# Patient Record
Sex: MALE | Race: WHITE | NOT HISPANIC OR LATINO | Employment: FULL TIME | URBAN - METROPOLITAN AREA
[De-identification: names, ages, dates, MRNs, and addresses within clinical notes are randomized per-mention and may not be internally consistent; named-entity substitution may affect disease eponyms.]

---

## 2017-06-01 ENCOUNTER — OFFICE VISIT - HEALTHEAST (OUTPATIENT)
Dept: INTERNAL MEDICINE | Facility: CLINIC | Age: 52
End: 2017-06-01

## 2017-06-01 DIAGNOSIS — Z00.00 ROUTINE GENERAL MEDICAL EXAMINATION AT A HEALTH CARE FACILITY: ICD-10-CM

## 2017-06-01 DIAGNOSIS — S76.301D RIGHT HAMSTRING INJURY, SUBSEQUENT ENCOUNTER: ICD-10-CM

## 2017-06-01 DIAGNOSIS — G89.29 CHRONIC LOW BACK PAIN: ICD-10-CM

## 2017-06-01 DIAGNOSIS — L30.9 ECZEMA: ICD-10-CM

## 2017-06-01 DIAGNOSIS — M54.50 CHRONIC LOW BACK PAIN: ICD-10-CM

## 2017-06-01 LAB
CHOLEST SERPL-MCNC: 187 MG/DL
FASTING STATUS PATIENT QL REPORTED: YES
HDLC SERPL-MCNC: 50 MG/DL
LDLC SERPL CALC-MCNC: 121 MG/DL
PSA SERPL-MCNC: 0.8 NG/ML (ref 0–3.5)
TRIGL SERPL-MCNC: 79 MG/DL

## 2017-06-01 ASSESSMENT — MIFFLIN-ST. JEOR: SCORE: 1636.85

## 2017-06-02 LAB — HCV AB SERPL QL IA: NEGATIVE

## 2017-06-16 ENCOUNTER — COMMUNICATION - HEALTHEAST (OUTPATIENT)
Dept: INTERNAL MEDICINE | Facility: CLINIC | Age: 52
End: 2017-06-16

## 2018-06-02 ENCOUNTER — RECORDS - HEALTHEAST (OUTPATIENT)
Dept: ADMINISTRATIVE | Facility: OTHER | Age: 53
End: 2018-06-02

## 2018-06-04 ENCOUNTER — COMMUNICATION - HEALTHEAST (OUTPATIENT)
Dept: SCHEDULING | Facility: CLINIC | Age: 53
End: 2018-06-04

## 2018-06-07 ENCOUNTER — RECORDS - HEALTHEAST (OUTPATIENT)
Dept: ADMINISTRATIVE | Facility: OTHER | Age: 53
End: 2018-06-07

## 2018-06-21 ENCOUNTER — COMMUNICATION - HEALTHEAST (OUTPATIENT)
Dept: INTERNAL MEDICINE | Facility: CLINIC | Age: 53
End: 2018-06-21

## 2018-09-28 ENCOUNTER — COMMUNICATION - HEALTHEAST (OUTPATIENT)
Dept: SCHEDULING | Facility: CLINIC | Age: 53
End: 2018-09-28

## 2019-06-21 ENCOUNTER — COMMUNICATION - HEALTHEAST (OUTPATIENT)
Dept: INTERNAL MEDICINE | Facility: CLINIC | Age: 54
End: 2019-06-21

## 2019-06-23 ENCOUNTER — AMBULATORY - HEALTHEAST (OUTPATIENT)
Dept: INTERNAL MEDICINE | Facility: CLINIC | Age: 54
End: 2019-06-23

## 2019-06-23 DIAGNOSIS — G89.29 CHRONIC LOW BACK PAIN WITHOUT SCIATICA, UNSPECIFIED BACK PAIN LATERALITY: ICD-10-CM

## 2019-06-23 DIAGNOSIS — M54.50 CHRONIC LOW BACK PAIN WITHOUT SCIATICA, UNSPECIFIED BACK PAIN LATERALITY: ICD-10-CM

## 2019-06-28 ENCOUNTER — HOSPITAL ENCOUNTER (OUTPATIENT)
Dept: MRI IMAGING | Facility: CLINIC | Age: 54
Discharge: HOME | End: 2019-06-28
Attending: INTERNAL MEDICINE

## 2019-06-28 DIAGNOSIS — G89.29 CHRONIC LOW BACK PAIN WITHOUT SCIATICA, UNSPECIFIED BACK PAIN LATERALITY: ICD-10-CM

## 2019-06-28 DIAGNOSIS — M54.50 CHRONIC LOW BACK PAIN WITHOUT SCIATICA, UNSPECIFIED BACK PAIN LATERALITY: ICD-10-CM

## 2019-07-02 ENCOUNTER — OFFICE VISIT - HEALTHEAST (OUTPATIENT)
Dept: INTERNAL MEDICINE | Facility: CLINIC | Age: 54
End: 2019-07-02

## 2019-07-02 DIAGNOSIS — M25.512 CHRONIC LEFT SHOULDER PAIN: ICD-10-CM

## 2019-07-02 DIAGNOSIS — Z00.00 ROUTINE GENERAL MEDICAL EXAMINATION AT A HEALTH CARE FACILITY: ICD-10-CM

## 2019-07-02 DIAGNOSIS — Z13.220 ENCOUNTER FOR SCREENING FOR LIPOID DISORDERS: ICD-10-CM

## 2019-07-02 DIAGNOSIS — G89.29 CHRONIC BILATERAL LOW BACK PAIN WITHOUT SCIATICA: ICD-10-CM

## 2019-07-02 DIAGNOSIS — G89.29 CHRONIC LEFT SHOULDER PAIN: ICD-10-CM

## 2019-07-02 DIAGNOSIS — M54.50 CHRONIC BILATERAL LOW BACK PAIN WITHOUT SCIATICA: ICD-10-CM

## 2019-07-02 DIAGNOSIS — Z12.5 ENCOUNTER FOR PROSTATE CANCER SCREENING: ICD-10-CM

## 2019-07-02 LAB
ALBUMIN SERPL-MCNC: 4 G/DL (ref 3.5–5)
ALBUMIN UR-MCNC: ABNORMAL MG/DL
ALP SERPL-CCNC: 50 U/L (ref 45–120)
ALT SERPL W P-5'-P-CCNC: 17 U/L (ref 0–45)
AMORPH CRY #/AREA URNS HPF: ABNORMAL /[HPF]
ANION GAP SERPL CALCULATED.3IONS-SCNC: 8 MMOL/L (ref 5–18)
APPEARANCE UR: ABNORMAL
AST SERPL W P-5'-P-CCNC: 17 U/L (ref 0–40)
BACTERIA #/AREA URNS HPF: ABNORMAL HPF
BILIRUB SERPL-MCNC: 0.8 MG/DL (ref 0–1)
BILIRUB UR QL STRIP: NEGATIVE
BUN SERPL-MCNC: 13 MG/DL (ref 8–22)
CALCIUM SERPL-MCNC: 9.1 MG/DL (ref 8.5–10.5)
CHLORIDE BLD-SCNC: 105 MMOL/L (ref 98–107)
CHOLEST SERPL-MCNC: 191 MG/DL
CO2 SERPL-SCNC: 27 MMOL/L (ref 22–31)
COLOR UR AUTO: YELLOW
CREAT SERPL-MCNC: 0.94 MG/DL (ref 0.7–1.3)
FASTING STATUS PATIENT QL REPORTED: YES
GFR SERPL CREATININE-BSD FRML MDRD: >60 ML/MIN/1.73M2
GLUCOSE BLD-MCNC: 82 MG/DL (ref 70–125)
GLUCOSE UR STRIP-MCNC: NEGATIVE MG/DL
HDLC SERPL-MCNC: 53 MG/DL
HGB BLD-MCNC: 15.4 G/DL (ref 14–18)
HGB UR QL STRIP: NEGATIVE
KETONES UR STRIP-MCNC: NEGATIVE MG/DL
LDLC SERPL CALC-MCNC: 125 MG/DL
LEUKOCYTE ESTERASE UR QL STRIP: NEGATIVE
MUCOUS THREADS #/AREA URNS LPF: ABNORMAL LPF
NITRATE UR QL: NEGATIVE
PH UR STRIP: 6 [PH] (ref 4.5–8)
POTASSIUM BLD-SCNC: 4.4 MMOL/L (ref 3.5–5)
PROT SERPL-MCNC: 6.4 G/DL (ref 6–8)
PSA SERPL-MCNC: 0.7 NG/ML (ref 0–3.5)
RBC #/AREA URNS AUTO: ABNORMAL HPF
SODIUM SERPL-SCNC: 140 MMOL/L (ref 136–145)
SP GR UR STRIP: 1.03 (ref 1–1.03)
SQUAMOUS #/AREA URNS AUTO: ABNORMAL LPF
TRIGL SERPL-MCNC: 63 MG/DL
UROBILINOGEN UR STRIP-ACNC: ABNORMAL
WBC #/AREA URNS AUTO: ABNORMAL HPF

## 2019-07-02 RX ORDER — IBUPROFEN 200 MG
200-400 TABLET ORAL EVERY 8 HOURS PRN
Qty: 100 TABLET | Refills: 2 | Status: SHIPPED
Start: 2019-07-02

## 2019-07-02 ASSESSMENT — MIFFLIN-ST. JEOR: SCORE: 1641.39

## 2019-07-03 ENCOUNTER — COMMUNICATION - HEALTHEAST (OUTPATIENT)
Dept: INTERNAL MEDICINE | Facility: CLINIC | Age: 54
End: 2019-07-03

## 2019-07-15 ENCOUNTER — THERAPY VISIT (OUTPATIENT)
Dept: PHYSICAL THERAPY | Facility: CLINIC | Age: 54
End: 2019-07-15
Payer: COMMERCIAL

## 2019-07-15 DIAGNOSIS — M54.50 LUMBAGO: ICD-10-CM

## 2019-07-15 PROCEDURE — 97110 THERAPEUTIC EXERCISES: CPT | Mod: GP | Performed by: PHYSICAL THERAPIST

## 2019-07-15 PROCEDURE — 97161 PT EVAL LOW COMPLEX 20 MIN: CPT | Mod: GP | Performed by: PHYSICAL THERAPIST

## 2019-07-15 NOTE — PROGRESS NOTES
[facility] Initial Evaluation  Subjective:  The history is provided by the patient.   Type of problem:  Lumbar   Condition occurred with:  Insidious onset. This is a new condition   Problem details: Usually worse in the PM.    Worse: bending, lifting (heavy).  Likes to canoe (was just in the [place] on a trip).  5 days a week does a stretching 30 min.  Kids:  Twins 13, 10 years old.   Patient gets tightness in the low back at night (nigthly).   Tightness in to the PVM, and lower lumbar spine. .   Patient reports pain:  Central lumbar spine.        Murray Piper being seen for Lumbar Pain that has been going on for many years.  The last few years the pain is getting worse.  Patient reports that he was working on a right hamstring issue that sometimes would make the back better and other times worse.  .     and reported as 4/10 on pain scale. General health as reported by patient is excellent. Pertinent medical history includes:  None.         Primary job tasks include:  Computer work.  Pain is described as aching, shooting and sharp         Patient is [employer] .                 MD order date of 7/3/19.          Objective:  System         Lumbar/SI Evaluation  ROM:    AROM Lumbar:   Flexion:          Nil restriction  Ext:                    Min restriction   Side Bend:        Left:     Right:   Rotation:           Left:     Right:   Side Glide:        Left:  Nil    Right:  Restriction        Strength: glute medius 4/5, glute max 4-/5, hip flexion 4+/5, abdominal 4+/5.    Lumbar Myotomes:  normal            Lumbar DTR's:  normal      Cord Signs:  normal    Lumbar Dermtomes:  normal                Neural Tension/Mobility:  Lumbar:  Normal  Thoracic:  Normal      Lumbar Palpation:    Tenderness present at Left:    Quadratus Lumborum and Erector Spinae  Tenderness present at Right: Quadratus Lumborum and Erector Spinae      Spinal Segmental Conclusions:     Level: noted at L5, L4, L3, L2  and L1      SI joint/Sacrum:    Slight pain with left thigh trust                                                         Sharad Lumbar Evaluation    Posture:  Sitting: good  Standing: good      Correction of Posture: better  Other Observations: Patient sits in good upright posture.  Movement Loss:  Flexion (Flex): nil  Extension (EXT): min and pain  Side [place] R (SG R): nil and pain  Side Glide L (SG L): nil  Test Movements:      JAMILAH: During: produces  After: no worse  Mechanical Response: no effect  Repeat JAMILAH: During: produces  After: no worse  Mechanical Response: IncROM  EIL: During: produces  After: no worse  Mechanical Response: no effect  Repeat EIL: During: produces  After: no worse  Mechanical Response: DecrROM                                             Patient had extension curve reversal coming from flexion to extension.      ROS    Assessment/Plan:    Patient is a 54 year old male with lumbar complaints.    Patient has the following significant findings with corresponding treatment plan.                Diagnosis 1:  Low Back Pain  Pain -  hot/cold therapy, US, manual therapy, splint/taping/bracing/orthotics, self management, education, directional preference exercise and home program  Decreased ROM/flexibility - manual therapy, therapeutic exercise and home program  Decreased joint mobility - manual therapy, therapeutic exercise, therapeutic activity and home program  Decreased strength - therapeutic exercise, therapeutic activities and home program  Impaired muscle performance - neuro re-education and home program  Decreased function - therapeutic activities and home program    Therapy Evaluation Codes:     Cumulative Therapy Evaluation is: Low complexity.    Previous and current functional limitations:  (See Goal Flow Sheet for this information)    Short term and Long term goals: (See Goal Flow Sheet for this information)     Communication ability:  Patient appears to be able to clearly communicate  and understand verbal and written communication and follow directions correctly.  Treatment Explanation - The following has been discussed with the patient:   RX ordered/plan of care  Anticipated outcomes  Possible risks and side effects  This patient would benefit from PT intervention to resume normal activities.   Rehab potential is excellent.    Frequency:  1 X week, once daily  Duration:  for 8 weeks  Discharge Plan:  Achieve all LTG.  Independent in home treatment program.  Reach maximal therapeutic benefit.    Please refer to the daily flowsheet for treatment today, total treatment time and time spent performing 1:1 timed codes.

## 2019-07-15 NOTE — LETTER
[facility]  [address]  [address]  [place] [address]  [phone]    2019    Re: Murray Piper   :   1965  MRN:  [id number]   REFERRING PHYSICIAN:   Ash Guardado  [facility]  Date of Initial Evaluation: 07/15/19  Visits: 1 Rxs Used: 1  Reason for Referral:  Lumbago    EVALUATION SUMMARY    [facility] Initial Evaluation    Subjective:  The history is provided by the patient.   Type of problem:  Lumbar   Condition occurred with:  Insidious onset. This is a new condition   Problem details: Usually worse in the PM.    Worse: bending, lifting (heavy).  Likes to canoe (was just in the [place] on a trip).  5 days a week does a stretching 30 min.  Kids:  Twins 13, 10 years old.   Patient gets tightness in the low back at night (nigthly).   Tightness in to the PVM, and lower lumbar spine. .   Patient reports pain:  Central lumbar spine.      Murray Piper being seen for Lumbar Pain that has been going on for many years.  The last few years the pain is getting worse.  Patient reports that he was working on a right hamstring issue that sometimes would make the back better and other times worse. and reported as 4/10 on pain scale. General health as reported by patient is excellent. Pertinent medical history includes:  None.         Primary job tasks include:  Computer work.  Pain is described as aching, shooting and sharp         Patient is [employer] .   MD order date of 7/3/19.        Objective:  System  Lumbar/SI Evaluation  ROM:    AROM Lumbar:   Flexion:          Nil restriction  Ext:                    Min restriction   Side Bend:        Left:     Right:   Rotation:           Left:     Right:   Side Glide:        Left:  Nil    Right:  Restriction  Strength: glute medius 4/5, glute max 4-/5, hip flexion 4+/5, abdominal 4+/5.    Lumbar Myotomes:  Normal  Re: Murray Piper   :    1965  MRN:  [id number]     Lumbar DTR's:  normal  Cord Signs:  normal  Lumbar Dermtomes:  normal  Neural Tension/Mobility:  Lumbar:  Normal  Thoracic:  Normal  Lumbar Palpation:    Tenderness present at Left:    Quadratus Lumborum and Erector Spinae  Tenderness present at Right: Quadratus Lumborum and Erector Spinae  Spinal Segmental Conclusions:   Level: noted at L5, L4, L3, L2 and L1  SI joint/Sacrum:    Slight pain with left thigh trust  Sharad Lumbar Evaluation  Posture:  Sitting: good  Standing: good  Correction of Posture: better  Other Observations: Patient sits in good upright posture.  Movement Loss:  Flexion (Flex): nil  Extension (EXT): min and pain  Side [place] R (SG R): nil and pain  Side Glide L (SG L): nil  Test Movements:  JAMILAH: During: produces  After: no worse  Mechanical Response: no effect  Repeat JAMILAH: During: produces  After: no worse  Mechanical Response: IncROM  EIL: During: produces  After: no worse  Mechanical Response: no effect  Repeat EIL: During: produces  After: no worse  Mechanical Response: DecrROM  Patient had extension curve reversal coming from flexion to extension.    Assessment/Plan:    Patient is a 54 year old male with lumbar complaints.    Patient has the following significant findings with corresponding treatment plan.                Diagnosis 1:  Low Back Pain  Pain -  hot/cold therapy, US, manual therapy, splint/taping/bracing/orthotics, self management, education, directional preference exercise and home program  Decreased ROM/flexibility - manual therapy, therapeutic exercise and home program  Decreased joint mobility - manual therapy, therapeutic exercise, therapeutic activity and home program  Decreased strength - therapeutic exercise, therapeutic activities and home program  Impaired muscle performance - neuro re-education and home program  Decreased function - therapeutic activities and home program  Therapy Evaluation Codes:   Cumulative Therapy Evaluation is:  Low complexity.  Previous and current functional limitations:  (See Goal Flow Sheet for this information)    Short term and Long term goals: (See Goal Flow Sheet for this information)   Communication ability:  Patient appears to be able to clearly communicate and understand verbal and written communication and follow directions correctly.  Treatment Explanation - The following has been discussed with the patient:   RX ordered/plan of care  Anticipated outcomes  Possible risks and side effects  This patient would benefit from PT intervention to resume normal activities.   Re: Murray Piper   :   1965  MRN:  [id number]     Rehab potential is excellent.  Frequency:  1 X week, once daily  Duration:  for 8 weeks  Discharge Plan:  Achieve all LTG.  Independent in home treatment program.  Reach maximal therapeutic benefit.      Thank you for your referral.    INQUIRIES  Therapist: Colt Harden   [facility]  [address] [place] [id number]  Phone: [phone]  Fax: [phone]

## 2019-07-17 ENCOUNTER — RECORDS - HEALTHEAST (OUTPATIENT)
Dept: ADMINISTRATIVE | Facility: OTHER | Age: 54
End: 2019-07-17

## 2019-07-23 ENCOUNTER — THERAPY VISIT (OUTPATIENT)
Dept: PHYSICAL THERAPY | Facility: CLINIC | Age: 54
End: 2019-07-23
Payer: COMMERCIAL

## 2019-07-23 DIAGNOSIS — M54.50 LUMBAGO: ICD-10-CM

## 2019-07-23 PROCEDURE — 97110 THERAPEUTIC EXERCISES: CPT | Mod: GP | Performed by: PHYSICAL THERAPIST

## 2019-07-23 PROCEDURE — 97112 NEUROMUSCULAR REEDUCATION: CPT | Mod: GP | Performed by: PHYSICAL THERAPIST

## 2019-08-01 ENCOUNTER — THERAPY VISIT (OUTPATIENT)
Dept: PHYSICAL THERAPY | Facility: CLINIC | Age: 54
End: 2019-08-01
Payer: COMMERCIAL

## 2019-08-01 DIAGNOSIS — M54.50 LUMBAGO: ICD-10-CM

## 2019-08-01 PROCEDURE — 97110 THERAPEUTIC EXERCISES: CPT | Mod: GP | Performed by: PHYSICAL THERAPIST

## 2019-08-01 PROCEDURE — 97112 NEUROMUSCULAR REEDUCATION: CPT | Mod: GP | Performed by: PHYSICAL THERAPIST

## 2019-09-03 ENCOUNTER — THERAPY VISIT (OUTPATIENT)
Dept: PHYSICAL THERAPY | Facility: CLINIC | Age: 54
End: 2019-09-03
Payer: COMMERCIAL

## 2019-09-03 DIAGNOSIS — M54.50 LUMBAGO: ICD-10-CM

## 2019-09-03 PROCEDURE — 97110 THERAPEUTIC EXERCISES: CPT | Mod: GP | Performed by: PHYSICAL THERAPIST

## 2019-09-03 PROCEDURE — 97112 NEUROMUSCULAR REEDUCATION: CPT | Mod: GP | Performed by: PHYSICAL THERAPIST

## 2019-09-17 ENCOUNTER — THERAPY VISIT (OUTPATIENT)
Dept: PHYSICAL THERAPY | Facility: CLINIC | Age: 54
End: 2019-09-17
Payer: COMMERCIAL

## 2019-09-17 DIAGNOSIS — M54.50 LUMBAGO: ICD-10-CM

## 2019-09-17 PROCEDURE — 97112 NEUROMUSCULAR REEDUCATION: CPT | Mod: GP | Performed by: PHYSICAL THERAPIST

## 2019-09-17 PROCEDURE — 97110 THERAPEUTIC EXERCISES: CPT | Mod: GP | Performed by: PHYSICAL THERAPIST

## 2019-09-25 ENCOUNTER — THERAPY VISIT (OUTPATIENT)
Dept: PHYSICAL THERAPY | Facility: CLINIC | Age: 54
End: 2019-09-25
Payer: COMMERCIAL

## 2019-09-25 DIAGNOSIS — M54.50 LUMBAGO: ICD-10-CM

## 2019-09-25 PROCEDURE — 97112 NEUROMUSCULAR REEDUCATION: CPT | Mod: GP | Performed by: PHYSICAL THERAPIST

## 2019-09-25 PROCEDURE — 97110 THERAPEUTIC EXERCISES: CPT | Mod: GP | Performed by: PHYSICAL THERAPIST

## 2019-12-11 PROBLEM — M54.50 LUMBAGO: Status: RESOLVED | Noted: 2019-07-15 | Resolved: 2019-12-11

## 2019-12-11 NOTE — PROGRESS NOTES
Discharge Note    Progress reporting period is from initial evaluation date (please see noted date below) to Sep 25, 2019.  Linked Episodes   Type: Episode: Status: Noted: Resolved: Last update: Updated by:   PHYSICAL THERAPY LBP 7/15/19 Active 7/15/2019  9/25/2019 10:18 AM Colt Harden PT      Comments:       Murray failed to follow up and current status is unknown.  Please see information below for last relevant information on current status.  Patient seen for 6 visits.    SUBJECTIVE  Subjective changes noted by patient:  Much better than last time.   .  Current pain level is  .     Previous pain level was   .   Changes in function:  Yes (See Goal flowsheet attached for changes in current functional level)  Adverse reaction to treatment or activity: None    OBJECTIVE  Changes noted in objective findings: Glute max 4+5, glute medius 4+/5, hip flexion 5/5.  Much better technique with glute exerciss and knee control.      ASSESSMENT/PLAN  Diagnosis: LBP   Updated problem list and treatment plan:   Pain - HEP  Decreased ROM/flexibility - HEP  Decreased function - HEP  Decreased strength - HEP  STG/LTGs have been met or progress has been made towards goals:  Yes, please see goal flowsheet for most current information  Assessment of Progress: current status is unknown.    Last current status: Pt is progressing as expected   Self Management Plans:  HEP  I have re-evaluated this patient and find that the nature, scope, duration and intensity of the therapy is appropriate for the medical condition of the patient.  Murray continues to require the following intervention to meet STG and LTG's:  HEP.    Recommendations:  Discharge with current home program.  Patient to follow up with MD as needed.    Please refer to the daily flowsheet for treatment today, total treatment time and time spent performing 1:1 timed codes.

## 2020-01-22 ENCOUNTER — OFFICE VISIT - HEALTHEAST (OUTPATIENT)
Dept: INTERNAL MEDICINE | Facility: CLINIC | Age: 55
End: 2020-01-22

## 2020-01-22 DIAGNOSIS — S63.602A SPRAIN OF LEFT THUMB, UNSPECIFIED SITE OF FINGER, INITIAL ENCOUNTER: ICD-10-CM

## 2020-01-22 ASSESSMENT — MIFFLIN-ST. JEOR: SCORE: 1623.24

## 2020-02-02 ENCOUNTER — RECORDS - HEALTHEAST (OUTPATIENT)
Dept: ADMINISTRATIVE | Facility: OTHER | Age: 55
End: 2020-02-02

## 2020-03-11 ENCOUNTER — HEALTH MAINTENANCE LETTER (OUTPATIENT)
Age: 55
End: 2020-03-11

## 2020-07-07 ENCOUNTER — OFFICE VISIT - HEALTHEAST (OUTPATIENT)
Dept: INTERNAL MEDICINE | Facility: CLINIC | Age: 55
End: 2020-07-07

## 2020-07-07 ENCOUNTER — COMMUNICATION - HEALTHEAST (OUTPATIENT)
Dept: SCHEDULING | Facility: CLINIC | Age: 55
End: 2020-07-07

## 2020-07-07 DIAGNOSIS — G57.11 MERALGIA PARAESTHETICA, RIGHT: ICD-10-CM

## 2020-08-10 ENCOUNTER — COMMUNICATION - HEALTHEAST (OUTPATIENT)
Dept: SCHEDULING | Facility: CLINIC | Age: 55
End: 2020-08-10

## 2020-08-14 ENCOUNTER — COMMUNICATION - HEALTHEAST (OUTPATIENT)
Dept: INTERNAL MEDICINE | Facility: CLINIC | Age: 55
End: 2020-08-14

## 2020-09-28 ENCOUNTER — OFFICE VISIT - HEALTHEAST (OUTPATIENT)
Dept: INTERNAL MEDICINE | Facility: CLINIC | Age: 55
End: 2020-09-28

## 2020-09-28 DIAGNOSIS — J31.2 CHRONIC SORE THROAT: ICD-10-CM

## 2020-09-28 DIAGNOSIS — Z23 NEED FOR IMMUNIZATION AGAINST INFLUENZA: ICD-10-CM

## 2020-09-28 DIAGNOSIS — Z13.220 ENCOUNTER FOR SCREENING FOR LIPOID DISORDERS: ICD-10-CM

## 2020-09-28 DIAGNOSIS — Z12.5 ENCOUNTER FOR PROSTATE CANCER SCREENING: ICD-10-CM

## 2020-09-28 DIAGNOSIS — G57.11 MERALGIA PARESTHETICA OF RIGHT SIDE: ICD-10-CM

## 2020-09-28 DIAGNOSIS — Z00.00 ROUTINE GENERAL MEDICAL EXAMINATION AT A HEALTH CARE FACILITY: ICD-10-CM

## 2020-09-28 LAB
ALBUMIN SERPL-MCNC: 4.2 G/DL (ref 3.5–5)
ALBUMIN UR-MCNC: NEGATIVE MG/DL
ALP SERPL-CCNC: 49 U/L (ref 45–120)
ALT SERPL W P-5'-P-CCNC: 20 U/L (ref 0–45)
ANION GAP SERPL CALCULATED.3IONS-SCNC: 9 MMOL/L (ref 5–18)
APPEARANCE UR: CLEAR
AST SERPL W P-5'-P-CCNC: 19 U/L (ref 0–40)
BILIRUB SERPL-MCNC: 1.1 MG/DL (ref 0–1)
BILIRUB UR QL STRIP: NEGATIVE
BUN SERPL-MCNC: 13 MG/DL (ref 8–22)
CALCIUM SERPL-MCNC: 9.4 MG/DL (ref 8.5–10.5)
CHLORIDE BLD-SCNC: 104 MMOL/L (ref 98–107)
CHOLEST SERPL-MCNC: 204 MG/DL
CO2 SERPL-SCNC: 27 MMOL/L (ref 22–31)
COLOR UR AUTO: YELLOW
CREAT SERPL-MCNC: 0.94 MG/DL (ref 0.7–1.3)
ERYTHROCYTE [DISTWIDTH] IN BLOOD BY AUTOMATED COUNT: 11 % (ref 11–14.5)
FASTING STATUS PATIENT QL REPORTED: YES
GFR SERPL CREATININE-BSD FRML MDRD: >60 ML/MIN/1.73M2
GLUCOSE BLD-MCNC: 95 MG/DL (ref 70–125)
GLUCOSE UR STRIP-MCNC: NEGATIVE MG/DL
HCT VFR BLD AUTO: 46.5 % (ref 40–54)
HDLC SERPL-MCNC: 61 MG/DL
HGB BLD-MCNC: 15.8 G/DL (ref 14–18)
HGB UR QL STRIP: NEGATIVE
KETONES UR STRIP-MCNC: NEGATIVE MG/DL
LDLC SERPL CALC-MCNC: 130 MG/DL
LEUKOCYTE ESTERASE UR QL STRIP: NEGATIVE
MCH RBC QN AUTO: 30.8 PG (ref 27–34)
MCHC RBC AUTO-ENTMCNC: 33.9 G/DL (ref 32–36)
MCV RBC AUTO: 91 FL (ref 80–100)
NITRATE UR QL: NEGATIVE
PH UR STRIP: 7 [PH] (ref 5–8)
PLATELET # BLD AUTO: 234 THOU/UL (ref 140–440)
PMV BLD AUTO: 7.8 FL (ref 7–10)
POTASSIUM BLD-SCNC: 5 MMOL/L (ref 3.5–5)
PROT SERPL-MCNC: 6.7 G/DL (ref 6–8)
PSA SERPL-MCNC: 0.5 NG/ML (ref 0–3.5)
RBC # BLD AUTO: 5.12 MILL/UL (ref 4.4–6.2)
SODIUM SERPL-SCNC: 140 MMOL/L (ref 136–145)
SP GR UR STRIP: 1.01 (ref 1–1.03)
TRIGL SERPL-MCNC: 65 MG/DL
UROBILINOGEN UR STRIP-ACNC: NORMAL
WBC: 5.4 THOU/UL (ref 4–11)

## 2020-09-28 RX ORDER — CETIRIZINE HYDROCHLORIDE 10 MG/1
10 TABLET ORAL DAILY
Qty: 30 TABLET | Refills: 2 | Status: SHIPPED | COMMUNITY
Start: 2020-09-28 | End: 2021-10-01

## 2020-09-28 RX ORDER — GABAPENTIN 300 MG/1
300-900 CAPSULE ORAL AT BEDTIME
Qty: 90 CAPSULE | Refills: 11 | Status: SHIPPED | OUTPATIENT
Start: 2020-09-28 | End: 2021-10-01

## 2020-09-28 ASSESSMENT — MIFFLIN-ST. JEOR: SCORE: 1627.78

## 2020-10-21 ENCOUNTER — COMMUNICATION - HEALTHEAST (OUTPATIENT)
Dept: INTERNAL MEDICINE | Facility: CLINIC | Age: 55
End: 2020-10-21

## 2020-12-18 ENCOUNTER — COMMUNICATION - HEALTHEAST (OUTPATIENT)
Dept: INTERNAL MEDICINE | Facility: CLINIC | Age: 55
End: 2020-12-18

## 2021-01-03 ENCOUNTER — HEALTH MAINTENANCE LETTER (OUTPATIENT)
Age: 56
End: 2021-01-03

## 2021-04-06 ENCOUNTER — COMMUNICATION - HEALTHEAST (OUTPATIENT)
Dept: INTERNAL MEDICINE | Facility: CLINIC | Age: 56
End: 2021-04-06

## 2021-04-15 ENCOUNTER — RECORDS - HEALTHEAST (OUTPATIENT)
Dept: ADMINISTRATIVE | Facility: OTHER | Age: 56
End: 2021-04-15

## 2021-04-15 LAB — CREAT SERPL-MCNC: 0.9 MG/DL (ref 0.7–1.2)

## 2021-04-19 ENCOUNTER — RECORDS - HEALTHEAST (OUTPATIENT)
Dept: ADMINISTRATIVE | Facility: OTHER | Age: 56
End: 2021-04-19

## 2021-04-25 ENCOUNTER — HEALTH MAINTENANCE LETTER (OUTPATIENT)
Age: 56
End: 2021-04-25

## 2021-04-29 ENCOUNTER — OFFICE VISIT - HEALTHEAST (OUTPATIENT)
Dept: INTERNAL MEDICINE | Facility: CLINIC | Age: 56
End: 2021-04-29

## 2021-04-29 DIAGNOSIS — S83.511A RUPTURE OF ANTERIOR CRUCIATE LIGAMENT OF RIGHT KNEE, INITIAL ENCOUNTER: ICD-10-CM

## 2021-04-29 DIAGNOSIS — Z01.818 PREOP GENERAL PHYSICAL EXAM: ICD-10-CM

## 2021-04-29 DIAGNOSIS — M79.651 PAIN OF RIGHT THIGH: ICD-10-CM

## 2021-04-29 ASSESSMENT — MIFFLIN-ST. JEOR: SCORE: 1596.03

## 2021-05-10 ENCOUNTER — RECORDS - HEALTHEAST (OUTPATIENT)
Dept: ADMINISTRATIVE | Facility: OTHER | Age: 56
End: 2021-05-10

## 2021-05-14 ENCOUNTER — RECORDS - HEALTHEAST (OUTPATIENT)
Dept: ADMINISTRATIVE | Facility: OTHER | Age: 56
End: 2021-05-14

## 2021-05-26 ENCOUNTER — COMMUNICATION - HEALTHEAST (OUTPATIENT)
Dept: INTERNAL MEDICINE | Facility: CLINIC | Age: 56
End: 2021-05-26

## 2021-05-26 DIAGNOSIS — R30.0 DYSURIA: ICD-10-CM

## 2021-05-27 ENCOUNTER — AMBULATORY - HEALTHEAST (OUTPATIENT)
Dept: LAB | Facility: CLINIC | Age: 56
End: 2021-05-27

## 2021-05-27 DIAGNOSIS — R30.0 DYSURIA: ICD-10-CM

## 2021-05-27 LAB
ALBUMIN UR-MCNC: NEGATIVE G/DL
APPEARANCE UR: CLEAR
BILIRUB UR QL STRIP: NEGATIVE
COLOR UR AUTO: YELLOW
GLUCOSE UR STRIP-MCNC: NEGATIVE MG/DL
HGB UR QL STRIP: NEGATIVE
KETONES UR STRIP-MCNC: NEGATIVE MG/DL
LEUKOCYTE ESTERASE UR QL STRIP: NEGATIVE
NITRATE UR QL: NEGATIVE
PH UR STRIP: 6.5 [PH] (ref 5–8)
SP GR UR STRIP: 1.01 (ref 1–1.03)
UROBILINOGEN UR STRIP-ACNC: NORMAL

## 2021-05-29 NOTE — TELEPHONE ENCOUNTER
Orders being requested: MRI  Reason service is needed/diagnosis: Low back pain  When are orders needed by: non-urgent  Where to send Orders: [facility]  Okay to leave detailed message?  Yes  [phone]    Patient declined to schedule an appointment. Patient stated he was told by Ash Guardado MD that if he wants an MRI, then to just to call him.

## 2021-05-29 NOTE — TELEPHONE ENCOUNTER
Back pain discussed at last appointment. MRI order placed. However, he should schedule an appointment to see me 1 week after scan is completed to review results

## 2021-05-30 NOTE — TELEPHONE ENCOUNTER
[facility] would be a good alternative. Please tell patient and forward this to our schedulors. I already placed order for PT and the referral can be changed/

## 2021-05-30 NOTE — TELEPHONE ENCOUNTER
Current in-network PT/OT are:    [facility]  [facility]  [facility]    If you would like HE, please place an internal order for PT/OT so they can contact the patient directly to schedule.       Thank you

## 2021-05-30 NOTE — PATIENT INSTRUCTIONS - HE
Recommending new shingles vaccine, Shingrix.  Check with your insurance and pharmacy for coverage  Colonoscopy will be due 2025  Continue annual flu shots

## 2021-05-30 NOTE — TELEPHONE ENCOUNTER
Referral Request  Type of referral: Physical therapy  Who s requesting: Patient  Why the request: Back pain as discussed with Dr. Guardado on 7/2/2019  Have you been seen for this request: Yes  Does patient have a preference on a group/provider? The patient would like to be seen by a provider that is in network. The patient states that the [facility] is not in network. The patient has a list of in network providers though the list is long and the patient states that he would like to be seen by a provider that Dr. Guardado recommends. The patient can upload the list of providers to My Chart for Dr. Guardado to review.   Okay to leave a detailed message?  Yes

## 2021-05-31 VITALS — WEIGHT: 173 LBS | BODY MASS INDEX: 24.22 KG/M2 | HEIGHT: 71 IN

## 2021-06-03 ENCOUNTER — RECORDS - HEALTHEAST (OUTPATIENT)
Dept: HEALTH INFORMATION MANAGEMENT | Facility: CLINIC | Age: 56
End: 2021-06-03

## 2021-06-03 VITALS — WEIGHT: 174 LBS | BODY MASS INDEX: 24.36 KG/M2 | HEIGHT: 71 IN

## 2021-06-04 VITALS
BODY MASS INDEX: 23.8 KG/M2 | HEIGHT: 71 IN | WEIGHT: 170 LBS | OXYGEN SATURATION: 98 % | SYSTOLIC BLOOD PRESSURE: 118 MMHG | DIASTOLIC BLOOD PRESSURE: 76 MMHG | HEART RATE: 55 BPM

## 2021-06-05 VITALS
TEMPERATURE: 96.3 F | HEART RATE: 73 BPM | SYSTOLIC BLOOD PRESSURE: 94 MMHG | DIASTOLIC BLOOD PRESSURE: 58 MMHG | OXYGEN SATURATION: 97 % | WEIGHT: 164 LBS | HEIGHT: 71 IN | BODY MASS INDEX: 22.96 KG/M2

## 2021-06-05 VITALS
HEART RATE: 57 BPM | OXYGEN SATURATION: 99 % | WEIGHT: 171 LBS | HEIGHT: 71 IN | DIASTOLIC BLOOD PRESSURE: 72 MMHG | SYSTOLIC BLOOD PRESSURE: 110 MMHG | BODY MASS INDEX: 23.94 KG/M2

## 2021-06-05 NOTE — PROGRESS NOTES
[facility] Follow Up Note    Murray Piper   54 y.o. male    Date of Visit: 1/22/2020    Chief Complaint   Patient presents with     Pain     left thumb     Subjective  This is a 54-year-old male who is a patient of Dr. Ash Truong.  He comes in because of an injury to his left thumb while skiing last weekend.  He was in [place] and went over the edge of a small hill and was dragging his left ski pole pushing his thumb backwards.  Immediately noticed swelling and discomfort at the base of that thumb.  The swelling spread out over most of the hand.  He did ice it down and he was actually able to ski the rest of the weekend without problem.  It is feeling better and the swelling is down and he has increasing range of motion but thought it should be looked at.    ROS A comprehensive review of systems was performed and was otherwise negative    Medications, allergies, and problem list were reviewed and updated    Exam  General Appearance:   On examination his blood pressure is 118/76.  Weight is 170 pounds and height is 71 inches.    Heart rhythm is stable with a rate of 56 and no ectopy.    Examination of the left hand shows no significant swelling.  There is very minimal tenderness at the base of the thumb.  His range of motion is reasonable if slow.  No swelling up the wrist or tenderness of the wrist either.      Assessment/Plan  1. Sprain of left thumb, unspecified site of finger, initial encounter       Left thumb injury.  At this point it appears to be most likely a sprain.  Alignment is good and so I am not sure an x-ray is going to tell us anything.  I did discuss this with him and said that he could watch it for the time being alternating some heat and cold on it.  He is right-handed so he does not have to overuse that left hand.  I suggested he give it until early next week to see how it is doing.  If he does not feel that it is making satisfactory progress we could refer  him to the hand doctors to take a look at it.  I also explained to him that if he changes his mind and wants to see someone earlier to let us know and we would set up the referral.  Body Mass Index was not assessed due to Patient was in with an acute medical issue..    Sal Gong MD      Current Outpatient Medications on File Prior to Visit   Medication Sig     ibuprofen (MOTRIN IB) 200 MG tablet Take 1-2 tablets (200-400 mg total) by mouth every 8 (eight) hours as needed for pain.     No current facility-administered medications on file prior to visit.      No Known Allergies  Social History     Tobacco Use     Smoking status: Never Smoker     Smokeless tobacco: Never Used   Substance Use Topics     Alcohol use: Yes     Comment: occassional     Drug use: Not on file

## 2021-06-09 NOTE — PATIENT INSTRUCTIONS - HE
Meralgia paresthetica (lateral femoral cutaneous nerve entrapment)    [email or web address]    Patients with suspected meralgia paresthetica should be asked about recent weight gain, use of tight-fitting clothes or belts, exercise habits, and other potential risk factors for compression at the inguinal ligament. A specific cause or provocative factor can be identified in up to half of patients  Meralgia paresthetica is a self-limited, benign disease in most patients. More than 90 percent of patients respond to conservative measures alone, although recurrent symptoms are common. For patients with symptoms that persist for more than one to two months despite conservative measures, anticonvulsants such as carbamazepine, phenytoin, or gabapentin can be helpful in reducing neuropathic pain. A local nerve block can also be considered. Rarely, surgical nerve decompression or sectioning is necessary.

## 2021-06-09 NOTE — TELEPHONE ENCOUNTER
"RN Triage  Murray is calling today reporting some itching along a \"nerve band\" on his right leg. The itching follows the path of a dermatome map he saw. From his glute down his thigh and to his knee. His niece thought it could be nerve damage or shingles. The itching is intense and there are some \"spots\" on his leg following the band on his leg, sometimes bumpy and pimple like. Itching has been going on for at least 3 weeks. Not particularly painful, but itches so bad it feels like it hurts. Maybe some numbness noted. No fevers.     I advised Murray he should have a virtual visit with a provider regarding his ongoing symptoms. He agrees to this plan, scheduling to assist with virtual visit. Murray verbalizes understanding of when to call back.    Marva Quintero RN  [facility] Nurse Advisor      Reason for Disposition    Shingles rash and onset > 72 hours ago    Additional Information    Negative: Difficult to awaken or acting confused (e.g., disoriented, slurred speech)    Negative: Sounds like a life-threatening emergency to the triager    Negative: Shingles rash of face and eye pain or blurred vision    Negative: Shingles rash on the eyelid or tip of the nose    Negative: Shingles rash and spots start appearing other places on body    Negative: Patient sounds very sick or weak to the triager    Negative: Shingles rash (matches SYMPTOMS) and weak immune system (e.g., HIV positive,  cancer chemotherapy, chronic steroid treatment, splenectomy) and NOT taking antiviral medication    Negative: Shingles rash of face and facial weakness    Negative: Shingles rash of face or ear and earache or ringing in the ear    Negative: Fever > 100.5 F (38.1 C)    Protocols used: SHINGLES-A-OH    COVID 19 Nurse Triage Plan/Patient Instructions    Please be aware that novel coronavirus (COVID-19) may be circulating in the community. If you develop symptoms such as fever, cough, or SOB or if you have concerns about the " presence of another infection including coronavirus (COVID-19), please contact your health care provider or visit [email or web address].     Disposition/Instructions    Patient to schedule a Virtual Visit with provider. Reference Visit Selection Guide.    Thank you for taking steps to prevent the spread of this virus.  o Limit your contact with others.  o Wear a simple mask to cover your cough.  o Wash your hands well and often.    Resources    [facility]: About COVID-19: [email or web address]    CDC: What to Do If You're Sick: www.cdc.gov/coronavirus/2019-ncov/about/steps-when-sick.html    CDC: Ending Home Isolation: www.cdc.gov/coronavirus/2019-ncov/hcp/disposition-in-home-patients.html     CDC: Caring for Someone: www.cdc.gov/coronavirus/2019-ncov/if-you-are-sick/care-for-someone.html     [facility]: Interim Guidance for Hospital Discharge to Home: [email or web address]./diseases/[email or web address]/hcp/hospdischarge.pdf    [facility] clinical trials (COVID-19 research studies): clinicalaffairs.[facility]-clinical-trials     Below are the COVID-19 hotlines at the [place] [facility] ([facility]). Interpreters are available.   o For health questions: Call 957-474-9354 or 1-381.586.5223 (7 a.m. to 7 p.m.)  o For questions about schools and childcare: Call 407-170-6710 or 1-446.414.5624 (7 a.m. to 7 p.m.)

## 2021-06-09 NOTE — PROGRESS NOTES
"Murray Piper is a 55 y.o. male who is being evaluated via a billable video visit.      The patient has been notified of following:     \"This video visit will be conducted via a call between you and your physician/provider. We have found that certain health care needs can be provided without the need for an in-person physical exam.  This service lets us provide the care you need with a video conversation.  If a prescription is necessary we can send it directly to your pharmacy.  If lab work is needed we can place an order for that and you can then stop by our lab to have the test done at a later time.    Video visits are billed at different rates depending on your insurance coverage. Please reach out to your insurance provider with any questions.    If during the course of the call the physician/provider feels a video visit is not appropriate, you will not be charged for this service.\"    Patient has given verbal consent to a Video visit? Yes  How would you like to obtain your AVS? AVS Preference: isocket.  Patient would like the video invitation sent by: Other e-mail: Shelfie platform  Will anyone else be joining your video visit? No        Video Start Time: 2:59 PM    Additional provider notes:   [facility] Video Note  Murray Piper   55 y.o. male    Date of Visit: 7/7/2020  Chief Complaint   Patient presents with     Rash     itching X3 weeks - RT leg     Assessment/Plan  1. Meralgia paraesthetica, right  Differential includes lumbosacral radiculopathy or plexopathy, or femoral neuropathy. Provided patient instructions on what to do for conservative measures. If persistent, can trial low dose gabapentin.      Much or all of the text in this note was generated through the use of Dragon Dictate voice-to-text software. Errors in spelling or words which seem out of context are unintentional. Sound alike errors, in particular, may have escaped editing  Josh Ro, " MD    Return if symptoms worsen or fail to improve.    Subjective  This 55 y.o. old male. Itching in the right lateral leg started 2-3 weeks ago. And has had numbness and tingling in the same area for about 1 month. Worse with sitting, lying in bed, standing. Tingling/numbness wraps around to the anterior thigh. Has tried allergy cream, benadryl. Sleeping in a different position made things better. The itching, numbness and tingling come and go. No falls and no leg weakness. Has been increasing his weight lifting and gym exercise for the last 1 month. Has GI/ function intact. Does have some foraminal narrowing in the lumbosacral spine, and worked with PT. He denies any specific back pain recently.     ROS A comprehensive review of systems was performed and was otherwise negative    Medications, allergies, and problem list were reviewed and updated    Exam  General appearance: Pleasant, nontoxic-appearing, no acute distress, alert and oriented x4  There were no vitals filed for this visit.  GENERAL: Healthy, alert and no distress  EYES: Eyes grossly normal to inspection. No discharge or erythema, or obvious scleral/conjunctival abnormalities.  RESP: No audible wheeze, cough, or visible cyanosis.  No visible retractions or increased work of breathing.    SKIN: few excoriations on right lateral thigh, otherwise unremarkable  NEURO: Cranial nerves grossly intact. Mentation and speech appropriate for age.    Additional Information   Current Outpatient Medications   Medication Sig Dispense Refill     ibuprofen (MOTRIN IB) 200 MG tablet Take 1-2 tablets (200-400 mg total) by mouth every 8 (eight) hours as needed for pain. 100 tablet 2     No current facility-administered medications for this visit.      No Known Allergies  Social History     Social History Narrative    [employer]ufact[employer]    , 3 children     Social History     Tobacco Use     Smoking status: Never Smoker     Smokeless tobacco: Never Used   Substance  Use Topics     Alcohol use: Yes     Comment: occassional     Drug use: Not on file     Family History   Problem Relation Age of Onset     Breast cancer Mother      Parkinsonism Father      Dementia Father      No Medical Problems Sister      No Medical Problems Brother      No Medical Problems Brother      Past Surgical History:   Procedure Laterality Date     COLONOSCOPY      Normal May 2015     SKIN SURGERY      Birthmark removal   Time: total time spent with the patient was 24 minutes of which >50% was spent in counseling and coordination of care     Video-Visit Details  Type of service:  Video Visit  Video End Time (time video stopped): 3:25 PM  Originating Location (pt. Location): Home  Distant Location (provider location):  [facility] INTERNAL MEDICINE     Platform used for Video Visit: Dina Ro MD

## 2021-06-10 NOTE — TELEPHONE ENCOUNTER
Working in garden yesterday afternoon and got stung/bit on the L hip buttock. Patient is unsure what bit him. He says today he has 2 small bite marks. I asked him if he was bit by insect or bee/wasp. Patient is unsure. He verbalizes concern that he was bit by a bat. He denies seeing a bat but states the bite eduardo looks like a bat bite based on photos from online. He denies swelling or red streaking around the bite. Does not seem infected. Is not especially itchy or painful. Denies fevers. Denies nausea, vomiting, or body aches.    I explained to patient that if the bite is due to an insect or wasp/bee then home care would be appropriate as the symptoms described sound mild. However if he believes that he has been bitten by a bat then it is recommended that he is seen in an ER or UC to assess for risk of rabies. Patient verbalizes understanding of this advice. He states he will likely go in to be seen today just to be safe because he is unsure what the bite is from.     Cheryl Perez RN      Reason for Disposition    Any break in skin from BITE (e.g., cut, puncture, or scratch) and WILD animal at RISK FOR RABIES (e.g., bat, raccoon, barraza, skunk, coyote, other carnivores)    Protocols used: ANIMAL BITE-A-[place]

## 2021-06-11 NOTE — PROGRESS NOTES
Office Visit - Physical   Murray Piper   52 y.o.  male    Date of visit: 6/1/2017  Physician: Ash Guardado MD     Assessment and Plan   1. Routine general medical examination at a health care facility  Immunizations are reviewed and are up-to-date.  Recommending annual flu shot.  Living will discussed.  Non-smoker.  Uses alcohol in moderation.  He exercises on a regular basis.  Up to date with colonoscopies and this should be repeated in 2025.  Prostate exam is normal and I will check a PSA for prostate cancer screening.    He sees his ophthalmologist every year and gets glaucoma screening.  Skin exam performed and recommending regular use of sunblock.  He sees dermatology annually.  Hepatitis C antibody for screening.  Will screen for diabetes with fasting glucose.  Checking fasting lipid profile.      - Lipid Cascade  - Comprehensive Metabolic Panel  - Urinalysis  - Hemoglobin  - Hepatitis C Antibody (Anti-HCV)  - PSA, Annual Screen (Prostatic-Specific Antigen)    2. Eczema  Using triamcinolone ointment as needed    3. Chronic low back pain  Chronic low back pain likely musculoligamentous.  We discussed getting an MRI of his lumbar spine to make sure he does not have a herniated disc contributing to some of his right leg pain which is attributed to his hamstring.  He may also benefit from PNBC.      4. Right hamstring injury, subsequent encounter  He did not find physical therapy very helpful.  As above, question whether this is referred pain from his back.    Return in about 1 year (around 6/1/2018) for Annual physical.     Chief Complaint   Chief Complaint   Patient presents with     Annual Exam        Patient Profile   Social History     Social History Narrative    Manufacturers Rep    , 3 children        Past Medical History   Patient Active Problem List   Diagnosis     Chronic LBP     Right hamstring injury     Eczema       Past Surgical History  He has a past surgical history that  includes Skin surgery and Colonoscopy.     History of Present Illness   This 52 y.o. old gentleman is here for his annual physical.  Overall feeling well.  Having ongoing chronic low back pain.  Also pain involving right hamstring.  Participated in physical therapy without much benefit.  He does still try to run for exercise.  He has not had an MRI of his back.    Review of Systems: A comprehensive review of systems was negative except as noted.  General: No chronic fatigue, unexpected weight loss or weight gain, fevers, chills, or night sweats  Eyes: No significant change in vision.  Seeing ophthalmologist regularly.  ENT: No ear or sinus infections.  No change in hearing.  No tinnitus.  Respiratory: No wheezing, dyspnea on exertion, or chronic cough  Cardiovascular: No chest pain, palpitations, dizziness, or syncope.  No peripheral edema.  GI: No abdominal pain, reflux symptoms, dysphagia, nausea, vomiting, constipation, or diarrhea  : No change in frequency or incontinence.  No hematuria.  Skin: No new rashes or lesions.  He sees dermatology  Neurologic: No headaches, seizures, dizziness, weakness, or numbness.  Musculoskeletal: Chronic low back pain  Lymphatic: No swollen lymph nodes  Psychiatric: No depression, anxiety, or sleep disorder.       Medications and Allergies   Current Outpatient Prescriptions   Medication Sig Dispense Refill     DEBACTEROL 30-50 % Swab USE AS DIRECTED  0     triamcinolone (KENALOG) 0.1 % cream Apply 0.1 application topically.  1     No current facility-administered medications for this visit.      No Known Allergies     Family and Social History   Family History   Problem Relation Age of Onset     Breast cancer Mother      Parkinsonism Father      Dementia Father      No Medical Problems Sister      No Medical Problems Brother      No Medical Problems Brother         Social History   Substance Use Topics     Smoking status: Never Smoker     Smokeless tobacco: Never Used      "Alcohol use Yes      Comment: occassional        Physical Exam   General Appearance:   Well-appearing middle-aged male    /70 (Patient Site: Right Arm, Patient Position: Sitting, Cuff Size: Adult Regular)  Pulse 77  Ht 5' 11\" (1.803 m)  Wt 173 lb (78.5 kg)  SpO2 97%  BMI 24.13 kg/m2    EYES: Eyelids, conjunctiva, and sclera were normal. Pupils were normal. Cornea, iris, and lens were normal bilaterally.  HEAD, EARS, NOSE, MOUTH, AND THROAT: Head and face were normal. Nose appearance was normal and there was no discharge. Oropharynx was normal.  NECK: Neck appearance was normal. There were no neck masses and the thyroid was not enlarged and no nodules are felt.  No lymphadenopathy.  RESPIRATORY: Breathing pattern was normal and the chest moved symmetrically.  Percussion/auscultatory percussion was normal.  Lung sounds were normal and there were no rales or wheezes.  CARDIOVASCULAR: Heart rate and rhythm were normal.  S1 and S2 were normal and there were no extra sounds or murmurs. Peripheral pulses in arms and legs were normal.  Jugular venous pressure was normal.  There was no peripheral edema.  No carotid bruits.  GASTROINTESTINAL: The abdomen was normal in contour.  Bowel sounds were present.  Percussion detected no organ enlargement or tenderness.  Palpation detected no tenderness, mass, or enlarged organs.   RECTAL/PROSTATE: No external lesions.  Sphincter tone normal.  No palpable rectal lesions.  Prostate normal size, smooth, nontender without palpable lesions.  MUSCULOSKELETAL: Skeletal configuration was normal and muscle mass was normal for age. Joint appearance was overall normal.  LYMPHATIC: There were no enlarged nodes.  SKIN/HAIR/NAILS: Skin color was normal.  There were no skin lesions.  Hair and nails were normal.  NEUROLOGIC: The patient was alert and oriented to person, place, time, and circumstance. Speech was normal. Cranial nerves were normal. Motor strength was normal for age. The " patient was normally coordinated.  Sensation intact.  PSYCHIATRIC:  Mood and affect were normal and the patient had normal recent and remote memory. The patient's judgment and insight were normal.       Additional Information        Ash Guardado MD  Internal Medicine  Contact me at [phone]

## 2021-06-11 NOTE — PATIENT INSTRUCTIONS - HE
Recommending shingles vaccine, Shingrix.  This can be obtained at your pharmacy    Colonoscopy will be due 2025    Continue to see your eye doctor every 1 to 2 years    Update me in 3 weeks on your symptoms status

## 2021-06-14 ENCOUNTER — RECORDS - HEALTHEAST (OUTPATIENT)
Dept: ADMINISTRATIVE | Facility: OTHER | Age: 56
End: 2021-06-14

## 2021-06-17 NOTE — PATIENT INSTRUCTIONS - HE

## 2021-06-17 NOTE — PROGRESS NOTES
[facility]  [address] [address]  Dept: [phone]  Dept Fax: [phone]  Primary Provider: Ash Guardado MD  Pre-op Performing Provider: ASH GUARDADO      PREOPERATIVE EVALUATION:  Today's date: 4/29/2021    Murray Piper is a 56 y.o. male who presents for a preoperative evaluation.    Surgical Information:  Surgery/Procedure: Reconstructive ACL -Right knee  Surgery Location: [facility]  Surgeon: Dr Hardin  Surgery Date: 5-  Time of Surgery: 2:00 PM  Where patient plans to recover: At home with family  Fax number for surgical facility: [phone]    Type of Anesthesia Anticipated: General    Assessment & Plan      The proposed surgical procedure is considered INTERMEDIATE risk.    Preop general physical exam  Good surgical candidate with no contraindications to proceeding with planned surgery and anesthesia.  Overall risk is low.  He does have a history of postop nausea and will need premedication.  He will avoid aspirin and NSAIDs prior to surgery.  He should receive aspirin 81 mg twice daily for 4 weeks following surgery for DVT prophylaxis.    He will get Covid testing completed within 72 hours of surgery  - Asymptomatic COVID-19 Virus (CORONAVIRUS) PCR    Rupture of anterior cruciate ligament of right knee, initial encounter  Torn ACL while downhill skiing with plans for surgery next month.    Pain of right thigh  Persistent itching, pain and numbness right anterior thigh requiring gabapentin 900 mg at bedtime.  Attributed to meralgia paresthetica but no imaging of the lumbar spine completed since onset of symptoms.  Symptoms in L3 distribution.  We will proceed with MRI lumbar spine to look for any L3 nerve root impingement.  Symptoms worsened when trying to taper down on gabapentin.  - MR Lumbar Spine Without Contrast             Medication Instructions:  Hold aspirin for 1 week prior to surgery    Hold ibuprofen,  Motrin, Advil and Aleve for 5 days prior to surgery    RECOMMENDATION:  APPROVAL GIVEN to proceed with proposed procedure, without further diagnostic evaluation.        41 minutes spent on the date of the encounter doing chart review, history and exam, documentation and further activities per the note        Subjective     HPI related to upcoming procedure: 56-year-old male with torn right ACL while downhill skiing with plans for surgery next month.    He has experienced nausea following anesthesia in the past.  No other significant complications from anesthesia.    No history of cardiac or respiratory problems.  Able to exercise vigorously without any exertional chest pain or increasing dyspnea.    No personal or family history of DVT or pulmonary embolus    We also discussed separate problem with ongoing itching, burning and numbness involving right anterior thigh.  This was attributed to meralgia paresthetica but we also discussed possible L3 nerve root impingement.  Continues on gabapentin taking 900 mg at bedtime.  Symptoms worsen when trying to wean off of the medication.    Preop Questions 4/29/2021   Have you ever had a heart attack or stroke? No   Have you ever had surgery on your heart or blood vessels, such as a stent placement, a coronary artery bypass, or surgery on an artery in your head, neck, heart, or legs? No   Do you have chest pain with activity? No   Do you have a history of  heart failure? No   Do you currently have a cold, bronchitis or symptoms of other infection? No   Do you have a cough, shortness of breath, or wheezing? No   Do you or anyone in your family have previous history of blood clots? No   Do you or does anyone in your family have a serious bleeding problem such as prolonged bleeding following surgeries or cuts? No   Have you ever had problems with anemia or been told to take iron pills? No   Have you had any abnormal blood loss such as black, tarry or bloody stools? No   Have you  "ever had a blood transfusion? No   Are you willing to have a blood transfusion if it is medically needed before, during, or after your surgery? Yes   Have you or any of your relatives ever had problems with anesthesia? YES -he has had nausea following anesthesia.  His father has history of \"difficulty awakening\" after surgery.   Do you have sleep apnea, excessive snoring or daytime drowsiness? No   Do you have any artifical heart valves or other implanted medical devices like a pacemaker, defibrillator, or continuous glucose monitor? No   Do you have artificial joints? No   Are you allergic to latex? No     Health Care Directive:  Patient does  have a Health Care Directive or Living Will: Patient states has Advance Directive and will bring in a copy to clinic.        Review of Systems  CONSTITUTIONAL: NEGATIVE for fever, chills, change in weight  RESP: NEGATIVE for significant cough or SOB  CV: NEGATIVE for chest pain, palpitations or peripheral edema  ROS otherwise negative    Patient Active Problem List    Diagnosis Date Noted     Meralgia paresthetica of right side 09/28/2020     Chronic sore throat 09/28/2020     Chronic left shoulder pain      Eczema      Chronic LBP      Past Medical History:   Diagnosis Date     Abdominal pain, left upper quadrant     EGD normal  2012     Bat bite wound 2020    Possible parathyroid.  Underwent rabies vaccination     Cervical disc herniation      Chronic LBP      Chronic left shoulder pain      Chronic maxillary sinusitis     ENT evaluation February 2015     Chronic sore throat 9/28/2020     Eczema      Facial trauma 2015     Globus sensation 2014    normal ENT evaluation     Influenza A 2014     Meralgia paresthetica of right side 9/28/2020     Recurrent pneumonia     IgG level normal 2015     Right hamstring injury 5/26/2016     Tinnitus 2012    ENT evaluation     Past Surgical History:   Procedure Laterality Date     COLONOSCOPY      Normal May 2015     SKIN SURGERY      " "Birthmark removal     Current Outpatient Medications   Medication Sig Dispense Refill     gabapentin (NEURONTIN) 300 MG capsule Take 1-3 capsules (300-900 mg total) by mouth at bedtime. 90 capsule 11     ibuprofen (MOTRIN IB) 200 MG tablet Take 1-2 tablets (200-400 mg total) by mouth every 8 (eight) hours as needed for pain. 100 tablet 2     cetirizine (ZYRTEC) 10 MG tablet Take 1 tablet (10 mg total) by mouth daily for 21 days. 30 tablet 2     No current facility-administered medications for this visit.        No Known Allergies    Social History     Tobacco Use     Smoking status: Never Smoker     Smokeless tobacco: Never Used   Substance Use Topics     Alcohol use: Yes     Comment: occassional      Family History   Problem Relation Age of Onset     Breast cancer Mother      Parkinsonism Father      Dementia Father      No Medical Problems Sister      No Medical Problems Brother      No Medical Problems Brother      Social History     Substance and Sexual Activity   Drug Use Not on file        Objective     BP 94/58 (Patient Site: Right Arm, Patient Position: Sitting, Cuff Size: Adult Regular)   Pulse 73   Temp (!) 96.3  F (35.7  C) (Tympanic)   Ht 5' 11\" (1.803 m)   Wt 164 lb (74.4 kg)   SpO2 97%   BMI 22.87 kg/m    Physical Exam  GENERAL APPEARANCE: healthy, alert and no distress  HENT: Normal  RESP: lungs clear to auscultation - no rales, rhonchi or wheezes  CV: regular rate and rhythm, normal S1 S2, no S3 or S4 and no murmur, click or rub  ABDOMEN: soft, nontender, no HSM or masses and bowel sounds normal  NEURO: Normal strength and tone, sensory exam grossly normal, mentation intact and speech normal         PRE-OP Diagnostics:   Labs-  Hemoglobin 15.6 platelet 257  Creatinine 0.9 potassium 4.5    No EKG required, no history of coronary heart disease, significant arrhythmia, peripheral arterial disease or other structural heart disease.    REVISED CARDIAC RISK INDEX (RCRI)   The patient has the " following serious cardiovascular risks for perioperative complications:   - No serious cardiac risks = 0 points    RCRI INTERPRETATION: 0 points: Class I (very low risk - 0.4% complication rate)         Signed Electronically by: Ash Guardado MD    Copy of this evaluation report is provided to requesting physician.

## 2021-06-27 NOTE — PROGRESS NOTES
Progress Notes by Ash Guardado MD at 7/2/2019  8:20 AM     Author: Ash Guardado MD Service: -- Author Type: Physician    Filed: 7/2/2019  9:01 AM Encounter Date: 7/2/2019 Status: Signed    : Ash Guardado MD (Physician)       MALE PREVENTATIVE EXAM    Assessment and Plan:       1. Routine general medical examination at a health care facility  Immunizations are reviewed and recommending Shingrix.  Continue annual flu shots.  Living will discussed.  Non-smoker.  Uses alcohol in moderation.  Regular exercise discussed.  Up to date with colonoscopies and this should be repeated in 2025.  Prostate exam is normal and I will check a PSA for prostate cancer screening.  He sees his ophthalmologist regularly and gets glaucoma screening.  Skin exam performed and recommending regular use of sunblock.  He will continue to see a dermatologist annually.  Hepatitis C antibody for screening was normal.  Will screen for diabetes with fasting glucose.  Checking fasting lipid profile.      - Urinalysis  - Comprehensive Metabolic Panel  - Hemoglobin    2. Chronic bilateral low back pain without sciatica  Chronic low back pain.  Reviewed MRI showing mild degenerative changes and small bulging disc at L5-S1.  There may be some contact with left S1 nerve root.  However, symptoms are primarily across low back without radiation.  As he likes to remain physically active, I think he would benefit from an aggressive physical therapy program such as [facility] for core muscle strengthening and I will make this referral  - ibuprofen (MOTRIN IB) 200 MG tablet; Take 1-2 tablets (200-400 mg total) by mouth every 8 (eight) hours as needed for pain.  Dispense: 100 tablet; Refill: 2  - Ambulatory referral to Physical Therapy    3. Chronic left shoulder pain  He will resume home exercises that have been taught    4. Encounter for prostate cancer screening    - PSA (Prostatic-Specific Antigen), Annual Screen    5. Encounter for  screening for lipoid disorders    - Lipid Cascade- FASTING    Over 15 minutes was spent addressing these worsening medical problems beyond time spent performing annual physical with over 50% of the time spent counseling and coordination of care    Next follow up:  Return in 1 year (on 7/2/2020) for Annual physical.    Immunization Review  Adult Imm Review: Recommending Shingrix and continued annual flu shots      I discussed the following with the patient:   Adult Healthy Living: Importance of regular exercise    I have had an Advance Directives discussion with the patient.    Subjective:   Chief Complaint: Murray Piper is an 54 y.o. male here for a preventative health visit.     HPI: Discussed chronic low back pain getting more bothersome.  Pain across lower back seldom radiating into his legs but occurring on an almost daily basis.  Worse when he bends over.  Certain activities aggravate as well including running.  He has had to modify his activities.  Underwent MRI showing mild degenerative changes and small bulging disc at L5-S1 which may be contacting left S1 nerve root.  No severe central or foraminal stenosis.    Healthy Habits  Are you taking a daily aspirin? No  Do you typically exercising at least 40 min, 3-4 times per week?  Yes  Do you usually eat at least 4 servings of fruit and vegetables a day, include whole grains and fiber and avoid regularly eating high fat foods? Yes  Have you had an eye exam in the past two years? Yes  Do you see a dentist twice per year? Yes  Do you have any concerns regarding sleep? YES    Safety Screen  If you own firearms, are they secured in a locked gun cabinet or with trigger locks? Patient does not mark any  Do you feel you are safe where you are living?: Yes (7/2/2019  8:17 AM)  Do you feel you are safe in your relationship(s)?: Yes (7/2/2019  8:17 AM)      Review of Systems:  Please see above.  The rest of the review of systems are negative for all systems.  "    Cancer Screening       Status Date      COLONOSCOPY Next Due 5/14/2025      Done 5/14/2015      Patient has more history with this topic...              History     Reviewed By Date/Time Sections Reviewed    Ash Guardado MD 7/2/2019  8:26 AM Medical, Surgical, Tobacco, Alcohol, Drug Use, Sexual Activity, Family, Social Documentation    Jes Nicole L, CMA 7/2/2019  8:16 AM Tobacco, Alcohol, Drug Use, Sexual Activity            Objective:   Vital Signs:   Visit Vitals  /64 (Patient Site: Left Arm, Patient Position: Sitting, Cuff Size: Adult Large)   Pulse 69   Ht 5' 11\" (1.803 m)   Wt 174 lb (78.9 kg)   SpO2 98%   BMI 24.27 kg/m           PHYSICAL EXAM  EYES: Eyelids, conjunctiva, and sclera were normal. Pupils were normal. Cornea, iris, and lens were normal bilaterally.  HEAD, EARS, NOSE, MOUTH, AND THROAT: Head and face were normal. Nose appearance was normal and there was no discharge. Oropharynx was normal.  NECK: Neck appearance was normal. There were no neck masses and the thyroid was not enlarged and no nodules are felt.  No lymphadenopathy.  RESPIRATORY: Breathing pattern was normal and the chest moved symmetrically.  Percussion/auscultatory percussion was normal.  Lung sounds were normal and there were no rales or wheezes.  CARDIOVASCULAR: Heart rate and rhythm were normal.  S1 and S2 were normal and there were no extra sounds or murmurs. Peripheral pulses in arms and legs were normal.  Jugular venous pressure was normal.  There was no peripheral edema.  No carotid bruits.  GASTROINTESTINAL: The abdomen was normal in contour.  Bowel sounds were present.   Palpation detected no tenderness, mass, or enlarged organs.   RECTAL/PROSTATE: No external lesions.  Sphincter tone normal.  No palpable rectal lesions.  Prostate normal size, smooth, nontender without nodules  MUSCULOSKELETAL: Skeletal configuration was normal and muscle mass was normal for age. Joint appearance was overall " normal.  LYMPHATIC: There were no enlarged nodes.  SKIN/HAIR/NAILS: Skin color was normal.  Hair and nails were normal.There were no skin lesions.  NEUROLOGIC: The patient was alert and oriented to person, place, time, and circumstance. Speech was normal. Cranial nerves were normal. Motor strength was normal for age. The patient was normally coordinated.  Sensation intact.  PSYCHIATRIC:  Mood and affect were normal and the patient had normal recent and remote memory. The patient's judgment and insight were normal.    The 10-year ASCVD risk score (Kingsley DC Jr., et al., 2013) is: 3.3%    Values used to calculate the score:      Age: 54 years      Sex: Male      Is Non- : No      Diabetic: No      Tobacco smoker: No      Systolic Blood Pressure: 104 mmHg      Is BP treated: No      HDL Cholesterol: 50 mg/dL      Total Cholesterol: 187 mg/dL         Medication List           Accurate as of 7/2/19  9:01 AM. If you have any questions, ask your nurse or doctor.               START taking these medications    ibuprofen 200 MG tablet  Also known as:  MOTRIN IB  INSTRUCTIONS:  Take 1-2 tablets (200-400 mg total) by mouth every 8 (eight) hours as needed for pain.  Started by:  Ash Guardado MD           STOP taking these medications    DEBACTEROL 30-50 % Swab  Generic drug:  sulfuric acid-sulfonat. phenol  Stopped by:  Ash Guardado MD     triamcinolone 0.1 % cream  Also known as:  KENALOG  Stopped by:  Ash Guardado MD           Where to Get Your Medications      Information about where to get these medications is not yet available    Ask your nurse or doctor about these medications    ibuprofen 200 MG tablet         Additional Screenings Completed Today:
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
## 2021-07-07 ENCOUNTER — RECORDS - HEALTHEAST (OUTPATIENT)
Dept: ADMINISTRATIVE | Facility: OTHER | Age: 56
End: 2021-07-07

## 2021-08-23 ENCOUNTER — TRANSFERRED RECORDS (OUTPATIENT)
Dept: HEALTH INFORMATION MANAGEMENT | Facility: CLINIC | Age: 56
End: 2021-08-23

## 2021-09-10 ENCOUNTER — TRANSFERRED RECORDS (OUTPATIENT)
Dept: HEALTH INFORMATION MANAGEMENT | Facility: CLINIC | Age: 56
End: 2021-09-10

## 2021-10-01 ENCOUNTER — OFFICE VISIT (OUTPATIENT)
Dept: INTERNAL MEDICINE | Facility: CLINIC | Age: 56
End: 2021-10-01
Payer: COMMERCIAL

## 2021-10-01 VITALS
WEIGHT: 163.8 LBS | HEART RATE: 60 BPM | OXYGEN SATURATION: 98 % | SYSTOLIC BLOOD PRESSURE: 116 MMHG | DIASTOLIC BLOOD PRESSURE: 84 MMHG | BODY MASS INDEX: 22.93 KG/M2 | HEIGHT: 71 IN

## 2021-10-01 DIAGNOSIS — M25.512 CHRONIC LEFT SHOULDER PAIN: ICD-10-CM

## 2021-10-01 DIAGNOSIS — S83.511D COMPLETE TEAR OF ANTERIOR CRUCIATE LIGAMENT OF RIGHT KNEE, SUBSEQUENT ENCOUNTER: ICD-10-CM

## 2021-10-01 DIAGNOSIS — G25.81 RESTLESS LEGS SYNDROME (RLS): ICD-10-CM

## 2021-10-01 DIAGNOSIS — G89.29 CHRONIC LEFT SHOULDER PAIN: ICD-10-CM

## 2021-10-01 DIAGNOSIS — Z12.5 SCREENING FOR PROSTATE CANCER: ICD-10-CM

## 2021-10-01 DIAGNOSIS — Z00.00 ROUTINE GENERAL MEDICAL EXAMINATION AT A HEALTH CARE FACILITY: Primary | ICD-10-CM

## 2021-10-01 DIAGNOSIS — G57.11 MERALGIA PARESTHETICA OF RIGHT SIDE: ICD-10-CM

## 2021-10-01 PROBLEM — L30.9 ECZEMA: Status: ACTIVE | Noted: 2020-08-10

## 2021-10-01 PROBLEM — S83.519A COMPLETE TEAR, KNEE, ANTERIOR CRUCIATE LIGAMENT: Status: ACTIVE | Noted: 2021-10-01

## 2021-10-01 PROBLEM — M79.651 PAIN OF RIGHT THIGH: Status: ACTIVE | Noted: 2021-04-29

## 2021-10-01 PROBLEM — M54.50 CHRONIC LOW BACK PAIN: Status: ACTIVE | Noted: 2020-08-10

## 2021-10-01 PROBLEM — J31.2 CHRONIC SORE THROAT: Status: ACTIVE | Noted: 2020-09-28

## 2021-10-01 LAB
ALBUMIN SERPL-MCNC: 4.5 G/DL (ref 3.5–5)
ALBUMIN UR-MCNC: NEGATIVE MG/DL
ALP SERPL-CCNC: 56 U/L (ref 45–120)
ALT SERPL W P-5'-P-CCNC: 19 U/L (ref 0–45)
ANION GAP SERPL CALCULATED.3IONS-SCNC: 11 MMOL/L (ref 5–18)
APPEARANCE UR: CLEAR
AST SERPL W P-5'-P-CCNC: 17 U/L (ref 0–40)
BILIRUB SERPL-MCNC: 1.6 MG/DL (ref 0–1)
BILIRUB UR QL STRIP: NEGATIVE
BUN SERPL-MCNC: 14 MG/DL (ref 8–22)
CALCIUM SERPL-MCNC: 9.3 MG/DL (ref 8.5–10.5)
CHLORIDE BLD-SCNC: 104 MMOL/L (ref 98–107)
CHOLEST SERPL-MCNC: 205 MG/DL
CO2 SERPL-SCNC: 25 MMOL/L (ref 22–31)
COLOR UR AUTO: YELLOW
CREAT SERPL-MCNC: 0.84 MG/DL (ref 0.7–1.3)
ERYTHROCYTE [DISTWIDTH] IN BLOOD BY AUTOMATED COUNT: 12.9 % (ref 10–15)
FASTING STATUS PATIENT QL REPORTED: ABNORMAL
FERRITIN SERPL-MCNC: 275 NG/ML (ref 27–300)
GFR SERPL CREATININE-BSD FRML MDRD: >90 ML/MIN/1.73M2
GLUCOSE BLD-MCNC: 92 MG/DL (ref 70–125)
GLUCOSE UR STRIP-MCNC: NEGATIVE MG/DL
HCT VFR BLD AUTO: 47 % (ref 40–53)
HDLC SERPL-MCNC: 67 MG/DL
HGB BLD-MCNC: 16 G/DL (ref 13.3–17.7)
HGB UR QL STRIP: NEGATIVE
IRON SATN MFR SERPL: 71 % (ref 20–50)
IRON SERPL-MCNC: 219 UG/DL (ref 42–175)
KETONES UR STRIP-MCNC: ABNORMAL MG/DL
LDLC SERPL CALC-MCNC: 126 MG/DL
LEUKOCYTE ESTERASE UR QL STRIP: NEGATIVE
MCH RBC QN AUTO: 30.1 PG (ref 26.5–33)
MCHC RBC AUTO-ENTMCNC: 34 G/DL (ref 31.5–36.5)
MCV RBC AUTO: 89 FL (ref 78–100)
NITRATE UR QL: NEGATIVE
PH UR STRIP: 6.5 [PH] (ref 5–8)
PLATELET # BLD AUTO: 234 10E3/UL (ref 150–450)
POTASSIUM BLD-SCNC: 4.7 MMOL/L (ref 3.5–5)
PROT SERPL-MCNC: 7.2 G/DL (ref 6–8)
PSA SERPL-MCNC: 0.61 UG/L (ref 0–3.5)
RBC # BLD AUTO: 5.31 10E6/UL (ref 4.4–5.9)
SODIUM SERPL-SCNC: 140 MMOL/L (ref 136–145)
SP GR UR STRIP: 1.02 (ref 1–1.03)
TIBC SERPL-MCNC: 309 UG/DL (ref 313–563)
TRANSFERRIN SERPL-MCNC: 247 MG/DL (ref 212–360)
TRIGL SERPL-MCNC: 60 MG/DL
UROBILINOGEN UR STRIP-ACNC: 0.2 E.U./DL
WBC # BLD AUTO: 5.6 10E3/UL (ref 4–11)

## 2021-10-01 PROCEDURE — 99396 PREV VISIT EST AGE 40-64: CPT | Mod: 25 | Performed by: INTERNAL MEDICINE

## 2021-10-01 PROCEDURE — 81003 URINALYSIS AUTO W/O SCOPE: CPT | Performed by: INTERNAL MEDICINE

## 2021-10-01 PROCEDURE — 85027 COMPLETE CBC AUTOMATED: CPT | Performed by: INTERNAL MEDICINE

## 2021-10-01 PROCEDURE — 82728 ASSAY OF FERRITIN: CPT | Performed by: INTERNAL MEDICINE

## 2021-10-01 PROCEDURE — 80053 COMPREHEN METABOLIC PANEL: CPT | Performed by: INTERNAL MEDICINE

## 2021-10-01 PROCEDURE — 90471 IMMUNIZATION ADMIN: CPT | Performed by: INTERNAL MEDICINE

## 2021-10-01 PROCEDURE — 99214 OFFICE O/P EST MOD 30 MIN: CPT | Mod: 25 | Performed by: INTERNAL MEDICINE

## 2021-10-01 PROCEDURE — 80061 LIPID PANEL: CPT | Performed by: INTERNAL MEDICINE

## 2021-10-01 PROCEDURE — G0103 PSA SCREENING: HCPCS | Performed by: INTERNAL MEDICINE

## 2021-10-01 PROCEDURE — 90682 RIV4 VACC RECOMBINANT DNA IM: CPT | Performed by: INTERNAL MEDICINE

## 2021-10-01 PROCEDURE — 36415 COLL VENOUS BLD VENIPUNCTURE: CPT | Performed by: INTERNAL MEDICINE

## 2021-10-01 PROCEDURE — 83540 ASSAY OF IRON: CPT | Performed by: INTERNAL MEDICINE

## 2021-10-01 PROCEDURE — 84466 ASSAY OF TRANSFERRIN: CPT | Performed by: INTERNAL MEDICINE

## 2021-10-01 RX ORDER — GABAPENTIN 300 MG/1
300 CAPSULE ORAL AT BEDTIME
Qty: 90 CAPSULE | Refills: 3
Start: 2021-10-01 | End: 2021-11-24

## 2021-10-01 ASSESSMENT — MIFFLIN-ST. JEOR: SCORE: 1595.12

## 2021-10-01 NOTE — PATIENT INSTRUCTIONS
I would recommend getting a CT coronary calcium score completed.  This can be scheduled at [facility].  [phone]    Preventive Health Recommendations  Male Ages 50 - 64    Yearly exam:             See your health care provider every year in order to  o   Review health changes.   o   Discuss preventive care.    o   Review your medicines if your doctor has prescribed any.     Cholesterol will be checked annually    Screen for diabetes with fasting glucose    Your colonoscopy will be due in 2025    You are being screened for prostate cancer with the digital exam and PSA annually    You should be tested each year for STDs (sexually transmitted diseases), if you re at risk.     Shots: Get a flu shot each year. Get a tetanus shot every 10 years.  Make sure you get your second Shingrix vaccine 2 to 6 months after the first.  Follow CDC guidelines regarding Covid booster    Nutrition:    Eat at least 5 servings of fruits and vegetables daily.     Eat whole-grain bread, whole-wheat pasta and brown rice instead of white grains and rice.     Get adequate Calcium and Vitamin D.     Lifestyle    Exercise for at least 150 minutes a week (30 minutes a day, 5 days a week). This will help you control your weight and prevent disease.     Limit alcohol to 1-2 drinks per day.     No smoking.     Wear sunscreen to prevent skin cancer.     See your dentist every six months for an exam and cleaning.     See your eye doctor every 1 to 2 years.

## 2021-10-01 NOTE — PROGRESS NOTES
SUBJECTIVE:   CC: Murray Piper is an 56 year old male who presents for preventative health visit.     HPI-in addition to his annual preventive visit, Bill is here to discuss medical problems including recent ACL tear and surgery, use of gabapentin to manage restless legs, and meralgia paresthetica involving right lower extremity.  Also recent left shoulder injury.  See assessment plan for details.    Patient has been advised of split billing requirements and indicates understanding: Yes  Healthy Habits:     Getting at least 3 servings of Calcium per day:  Yes    Bi-annual eye exam:  Yes    Dental care twice a year:  Yes    Sleep apnea or symptoms of sleep apnea:  None    Diet:  Regular (no restrictions)    Frequency of exercise:  6-7 days/week    Duration of exercise:  45-60 minutes    Taking medications regularly:  Not Applicable    Medication side effects:  Not applicable    PHQ-2 Total Score: 0    Additional concerns today:  No        Today's PHQ-2 Score:   PHQ-2 ( 1999 Pfizer) 10/1/2021   Q1: Little interest or pleasure in doing things 0   Q2: Feeling down, depressed or hopeless 0   PHQ-2 Score 0   Q1: Little interest or pleasure in doing things Not at all   Q2: Feeling down, depressed or hopeless Not at all   PHQ-2 Score 0       Abuse: Current or Past(Physical, Sexual or Emotional)- No  Do you feel safe in your environment? Yes        Social History     Tobacco Use     Smoking status: Never Smoker     Smokeless tobacco: Never Used   Substance Use Topics     Alcohol use: Yes     Comment: Alcoholic Drinks/day: occassional         Alcohol Use 10/1/2021   Prescreen: >3 drinks/day or >7 drinks/week? Not Applicable       Last PSA:   Prostate Specific Antigen Screen   Date Value Ref Range Status   09/28/2020 0.5 0.0 - 3.5 ng/mL Final       Reviewed orders with patient. Reviewed health maintenance and updated orders accordingly - Yes  Lab work is in process    Reviewed and updated as needed this visit by clinical  "staff  Tobacco  Allergies  Meds              Reviewed and updated as needed this visit by Provider                Past Medical History:   Diagnosis Date     Abdominal pain, left upper quadrant     EGD normal  2012     Bat bite wound 01/01/2020    Possible parathyroid.  Underwent rabies vaccination     Cervical disc herniation      Chronic LBP      Chronic left shoulder pain     labral tear     Chronic maxillary sinusitis     ENT evaluation February 2015     Chronic sore throat 09/28/2020     Eczema      Facial trauma 01/01/2015     Globus sensation 01/01/2014    normal ENT evaluation     Influenza A 01/01/2014     Meralgia paresthetica of right side 09/28/2020     Pain of right thigh 04/29/2021     Recurrent pneumonia     IgG level normal 2015     Restless legs syndrome (RLS) 10/1/2021     Right hamstring injury 05/26/2016     Rupture of anterior cruciate ligament of right knee 04/29/2021     Tinnitus 01/01/2012    ENT evaluation      Past Surgical History:   Procedure Laterality Date     ARTHROSCOPIC REPAIR ACL Right     2021     COLONOSCOPY      Normal May 2015     SKIN SURGERY      Birthmark removal     WISDOM TOOTH EXTRACTION         Review of Systems  12 point review of systems is negative other than what is discussed in the assessment and plan    OBJECTIVE:   /84 (BP Location: Right arm, Patient Position: Sitting, Cuff Size: Adult Regular)   Pulse 60   Ht 1.803 m (5' 11\")   Wt 74.3 kg (163 lb 12.8 oz)   SpO2 98%   BMI 22.85 kg/m      Physical Exam  EYES: Eyelids, conjunctiva, and sclera were normal. Pupils were normal. Cornea, iris, and lens were normal bilaterally.  HEAD, EARS, NOSE, MOUTH, AND THROAT: Head and face were normal. TMs and external auditory canals are normal  NECK: Neck appearance was normal. There were no neck masses and the thyroid was not enlarged and no nodules are felt.  No lymphadenopathy.  RESPIRATORY: Breathing pattern was normal and the chest moved symmetrically.   Lung " sounds were normal and there were no rales or wheezes.  CARDIOVASCULAR: Heart rate and rhythm were normal.  S1 and S2 were normal and there were no extra sounds or murmurs. Peripheral pulses in arms and legs were normal.  Jugular venous pressure was normal.  There was no peripheral edema.  No carotid bruits.  GASTROINTESTINAL:  Bowel sounds were present.   Palpation detected no tenderness, mass, or enlarged organs.   RECTAL/PROSTATE: No external lesions.  Sphincter tone normal.  No palpable rectal lesions.  Prostate normal size, smooth, nontender without nodules  MUSCULOSKELETAL: Skeletal configuration was normal and muscle mass was normal for age. Joint appearance was overall normal.  LYMPHATIC: There were no enlarged nodes.  SKIN/HAIR/NAILS: Skin color was normal.  Hair and nails were normal.There were no skin lesions.  NEUROLOGIC: The patient was alert and oriented to person, place, time, and circumstance. Speech was normal. Cranial nerves were normal. Motor strength was normal for age. The patient was normally coordinated.  Sensation intact.  PSYCHIATRIC:  Mood and affect were normal and the patient had normal recent and remote memory. The patient's judgment and insight were normal.        ASSESSMENT/PLAN:   1. Routine general medical examination at a health care facility  Immunizations are reviewed and recommending annual flu shot.  He will get his second Shingrix vaccine in 2 to 6 months.  We discussed Covid booster.  Living will discussed.  Non-smoker.  Uses alcohol in moderation.  Regular exercise discussed.  Up to date with colonoscopies and this should be repeated in 2025.  Prostate exam is normal and I will check a PSA for prostate cancer screening.   He sees his ophthalmologist every year. Skin examperformed and recommending regular use of sunblock.  He does see a dermatologist every year.  Hepatitis C antibody for screening was normal.  Will screen for diabetes with fasting glucose.  Checking fasting  lipid profile.    - CBC with platelets; Future  - Comprehensive metabolic panel (BMP + Alb, Alk Phos, ALT, AST, Total. Bili, TP); Future  - PSA, screen; Future  - UA Macro with Reflex to Micro and Culture - lab collect; Future  - Lipid Profile (Chol, Trig, HDL, LDL calc); Future  - CBC with platelets  - Comprehensive metabolic panel (BMP + Alb, Alk Phos, ALT, AST, Total. Bili, TP)  - PSA, screen  - Lipid Profile (Chol, Trig, HDL, LDL calc)  - UA Macro with Reflex to Micro and Culture - lab collect    We discussed getting a CT coronary calcium score if cholesterol is not completely at goal    2. Complete tear of anterior cruciate ligament of right knee, subsequent encounter  Recent surgery for ACL repair doing well    3. Restless legs syndrome (RLS)  Although his meralgia paresthetica symptoms have resolved, he has found that gabapentin is helpful controlling restless leg symptoms that occur at night that were interfering with his sleep.  This has been a problem for a long time.  We will check iron studies to make sure this is not a contributing factor.  It is reasonable to continue on 300 mg of gabapentin at bedtime.  - Iron binding panel; Future  - Ferritin; Future  - Iron binding panel  - Ferritin    4. Chronic left shoulder pain  He will follow up with orthopedics    5. Meralgia paresthetica of right side  Symptoms have resolved  - gabapentin (NEURONTIN) 300 MG capsule; Take 1 capsule (300 mg) by mouth At Bedtime  Dispense: 90 capsule; Refill: 3    6. Screening for prostate cancer    - PSA, screen; Future  - PSA, screen    Patient has been advised of split billing requirements and indicates understanding: Yes  COUNSELING:   Reviewed preventive health counseling, as reflected in patient instructions       Regular exercise       Healthy diet/nutrition       Vision screening       Colon cancer screening       Prostate cancer screening    Estimated body mass index is 22.85 kg/m  as calculated from the following:     "Height as of this encounter: 1.803 m (5' 11\").    Weight as of this encounter: 74.3 kg (163 lb 12.8 oz).         He reports that he has never smoked. He has never used smokeless tobacco.      Counseling Resources:  ATP IV Guidelines  Pooled Cohorts Equation Calculator  FRAX Risk Assessment  ICSI Preventive Guidelines  Dietary Guidelines for Americans, 2010  USDA's MyPlate  ASA Prophylaxis  Lung CA Screening    Ash Guardado MD  [facility]  "

## 2021-10-01 NOTE — LETTER
October 4, 2021      Murray Piper  [address] [place] [address] [address] [place] [address]        Dear Adela Santana   Iron binding panel   Result Value Ref Range    Iron 219 (H) 42 - 175 ug/dL    Transferrin 247 212 - 360 mg/dL    Transferrin Saturation, Calculated 71 (H) 20 - 50 %    Transferrin IBC, Calculated 309 (L) 313 - 563 ug/dL   CBC with platelets   Result Value Ref Range    WBC Count 5.6 4.0 - 11.0 10e3/uL    RBC Count 5.31 4.40 - 5.90 10e6/uL    Hemoglobin 16.0 13.3 - 17.7 g/dL    Hematocrit 47.0 40.0 - 53.0 %    MCV 89 78 - 100 fL    MCH 30.1 26.5 - 33.0 pg    MCHC 34.0 31.5 - 36.5 g/dL    RDW 12.9 10.0 - 15.0 %    Platelet Count 234 150 - 450 10e3/uL   Comprehensive metabolic panel (BMP + Alb, Alk Phos, ALT, AST, Total. Bili, TP)   Result Value Ref Range    Sodium 140 136 - 145 mmol/L    Potassium 4.7 3.5 - 5.0 mmol/L    Chloride 104 98 - 107 mmol/L    Carbon Dioxide (CO2) 25 22 - 31 mmol/L    Anion Gap 11 5 - 18 mmol/L    Urea Nitrogen 14 8 - 22 mg/dL    Creatinine 0.84 0.70 - 1.30 mg/dL    Calcium 9.3 8.5 - 10.5 mg/dL    Glucose 92 70 - 125 mg/dL    Alkaline Phosphatase 56 45 - 120 U/L    AST 17 0 - 40 U/L    ALT 19 0 - 45 U/L    Protein Total 7.2 6.0 - 8.0 g/dL    Albumin 4.5 3.5 - 5.0 g/dL    Bilirubin Total 1.6 (H) 0.0 - 1.0 mg/dL    GFR Estimate >90 >60 mL/min/1.73m2      Comment:      As of July 11, 2021, eGFR is calculated by the CKD-EPI creatinine equation, without race adjustment. eGFR can be influenced by muscle mass, exercise, and diet. The reported eGFR is an estimation only and is only applicable if the renal function is stable.   PSA, screen   Result Value Ref Range    Prostate Specific Antigen Screen 0.61 0.00 - 3.50 ug/L   Lipid Profile (Chol, Trig, HDL, LDL calc)   Result Value Ref Range    Cholesterol 205 (H) <=199 mg/dL    Triglycerides 60 <=149 mg/dL    Direct Measure HDL 67 >=40 mg/dL      Comment:      HDL Cholesterol Reference Range:     0-2 years:   No reference ranges  established for patients under 2 years old  at [facility] for lipid analytes.    2-8 years:  Greater than 45 mg/dL     18 years and older:   Female: Greater than or equal to 50 mg/dL   Male:   Greater than or equal to 40 mg/dL    LDL Cholesterol Calculated 126 <=129 mg/dL    Patient Fasting > 8hrs? Unknown    Ferritin   Result Value Ref Range    Ferritin 275 27 - 300 ng/mL   UA Macro with Reflex to Micro and Culture - lab collect   Result Value Ref Range    Color Urine Yellow Colorless, Straw, Light Yellow, Yellow    Appearance Urine Clear Clear    Glucose Urine Negative Negative mg/dL    Bilirubin Urine Negative Negative    Ketones Urine Trace (A) Negative mg/dL    Specific Gravity Urine 1.020 1.005 - 1.030    Blood Urine Negative Negative    pH Urine 6.5 5.0 - 8.0    Protein Albumin Urine Negative Negative mg/dL    Urobilinogen Urine 0.2 0.2, 1.0 E.U./dL    Nitrite Urine Negative Negative    Leukocyte Esterase Urine Negative Negative    Narrative    Microscopic not indicated     No lack of iron. Your levels are actually slightly elevated. Are you taking an iron supplement or a multivitamin that contains iron?    Cholesterol is well controlled and lower than last year. No diabetes. No anemia. Normal kidney function. Normal liver studies. PSA is reassuring for no prostate cancer. Urinalysis is normal.    If you have any questions or concerns, please call the clinic at the number listed above.       Sincerely,      Ash Guardado MD

## 2021-10-26 ENCOUNTER — TRANSFERRED RECORDS (OUTPATIENT)
Dept: HEALTH INFORMATION MANAGEMENT | Facility: CLINIC | Age: 56
End: 2021-10-26
Payer: COMMERCIAL

## 2021-11-23 DIAGNOSIS — G57.11 MERALGIA PARESTHETICA OF RIGHT SIDE: ICD-10-CM

## 2021-11-23 NOTE — TELEPHONE ENCOUNTER
Reason for Call:  Medication or medication refill: gabapentin (NEURONTIN) 300 MG capsule    Do you use a [facility]?  NO Name of the pharmacy and phone number for the current request: [facility] [address] in [place], [phone]    Name of the medication requested: gabapentin (NEURONTIN) 300 MG capsule    Other request: Patient states pharmacy does not have the above Rx on file. Patient is requesting the Rx to be sent again to his pharmacy.    Can we leave a detailed message on this number? YES    Phone number patient can be reached at: Cell number on file:    Telephone Information:   Mobile [phone]                                                   Best Time: any    Call taken on 11/23/2021 at 2:38 PM by Meghan Mcnamara

## 2021-11-24 RX ORDER — GABAPENTIN 300 MG/1
300 CAPSULE ORAL AT BEDTIME
Qty: 90 CAPSULE | Refills: 3 | Status: SHIPPED | OUTPATIENT
Start: 2021-11-24 | End: 2022-09-07

## 2021-11-24 NOTE — TELEPHONE ENCOUNTER
Patient called to check on the status of his refill. Patient states somebody told him that the Rx would be sent to his pharmacy yesterday. TC advised patient that the turn around time is 1-3 business days. Patient was wondering if TC could send a message to PCP advising him that he is out of his medication. TC advised I would send the message.    Meghan Mcnamara

## 2022-08-22 DIAGNOSIS — G57.11 MERALGIA PARESTHETICA OF RIGHT SIDE: ICD-10-CM

## 2022-08-23 RX ORDER — GABAPENTIN 300 MG/1
300 CAPSULE ORAL AT BEDTIME
Qty: 90 CAPSULE | Refills: 3 | OUTPATIENT
Start: 2022-08-23

## 2022-09-06 DIAGNOSIS — G57.11 MERALGIA PARESTHETICA OF RIGHT SIDE: ICD-10-CM

## 2022-09-07 RX ORDER — GABAPENTIN 300 MG/1
300 CAPSULE ORAL AT BEDTIME
Qty: 90 CAPSULE | Refills: 3 | Status: SHIPPED | OUTPATIENT
Start: 2022-09-07 | End: 2022-11-17

## 2022-09-07 NOTE — TELEPHONE ENCOUNTER
Routing refill request to provider for review/approval because:  Drug not on the [facility] refill protocol     Last Written Prescription Date:  11/24/21  Last Fill Quantity: 90,  # refills: 3   Last office visit provider:  10/1/21     Requested Prescriptions   Pending Prescriptions Disp Refills     gabapentin (NEURONTIN) 300 MG capsule 90 capsule 3     Sig: Take 1 capsule (300 mg) by mouth At Bedtime       There is no refill protocol information for this order          Ron Groves RN 09/07/22 11:05 AM

## 2022-09-18 ENCOUNTER — HEALTH MAINTENANCE LETTER (OUTPATIENT)
Age: 57
End: 2022-09-18

## 2022-11-16 ENCOUNTER — NURSE TRIAGE (OUTPATIENT)
Dept: FAMILY MEDICINE | Facility: CLINIC | Age: 57
End: 2022-11-16

## 2022-11-16 NOTE — TELEPHONE ENCOUNTER
Clinic Action Needed:Yes    Reason for Call:    Patient calling requesting to know if a new prescription could be sent for Gabapentin 100 mg capsules instead of the 300 mg as prescribed.    Patient stating he would like to see if he is able to take a lower dose of Gabapentin, taper down.    Stating he has been reading information on side effects, including drowsiness and would like to try lower dose.    Annual physical scheduled for 1/6/2023.      Reason for Disposition    Caller has NON-URGENT medicine question about med that PCP or specialist prescribed and triager unable to answer question    Additional Information    Negative: Intentional drug overdose and suicidal thoughts or ideas    Negative: Drug overdose and triager unable to answer question    Negative: Caller requesting a renewal or refill of a medicine patient is currently taking    Negative: Caller requesting information unrelated to medicine    Negative: Caller requesting information about COVID-19 Vaccine    Negative: Caller requesting information about Emergency Contraception    Negative: Caller requesting information about Combined Birth Control Pills    Negative: Caller requesting information about Progestin Birth Control Pills    Negative: Caller requesting information about Post-Op pain or medicines    Negative: Caller requesting a prescription antibiotic (such as penicillin) for Strep throat and has a positive culture result    Negative: Caller requesting a prescription anti-viral med (such as Tamiflu) and has influenza (flu) symptoms    Negative: Immunization reaction suspected    Negative: Rash while taking a medicine or within 3 days of stopping it    Negative: Asthma and having symptoms of asthma (cough, wheezing, etc.)    Negative: Symptom of illness (e.g., headache, abdominal pain, earache, vomiting) that are more than mild    Negative: Breastfeeding questions about mother's medicines and diet    Negative: MORE THAN A DOUBLE DOSE of a  prescription or over-the-counter (OTC) drug    Negative: DOUBLE DOSE (an extra dose or lesser amount) of prescription drug and any symptoms (e.g., dizziness, nausea, pain, sleepiness)    Negative: DOUBLE DOSE (an extra dose or lesser amount) of over-the-counter (OTC) drug and any symptoms (e.g., dizziness, nausea, pain, sleepiness)    Negative: Took another person's prescription drug    Negative: DOUBLE DOSE (an extra dose or lesser amount) of prescription drug and NO symptoms  (Exception: A double dose of antibiotics.)    Negative: Diabetes drug error or overdose (e.g., took wrong type of insulin or took extra dose)    Negative: Caller has medication question about med NOT prescribed by PCP and triager unable to answer question (e.g., compatibility with other med, storage)    Negative: Prescription not at pharmacy and was prescribed by PCP recently  (Exception: triager has access to EMR and prescription is recorded there. Go to Home Care and confirm for pharmacy.)    Negative: Pharmacy calling with prescription question and triager unable to answer question    Negative: Caller has URGENT medicine question about med that PCP or specialist prescribed and triager unable to answer question    Protocols used: MEDICATION QUESTION CALL-A-[place]

## 2022-11-17 DIAGNOSIS — G25.81 RESTLESS LEGS SYNDROME (RLS): Primary | ICD-10-CM

## 2022-11-17 RX ORDER — GABAPENTIN 100 MG/1
100-300 CAPSULE ORAL
Qty: 90 CAPSULE | Refills: 11 | Status: SHIPPED | OUTPATIENT
Start: 2022-11-17 | End: 2023-01-06

## 2023-01-05 ENCOUNTER — TELEPHONE (OUTPATIENT)
Dept: INTERNAL MEDICINE | Facility: CLINIC | Age: 58
End: 2023-01-05

## 2023-01-05 DIAGNOSIS — Z00.00 HEALTHCARE MAINTENANCE: Primary | ICD-10-CM

## 2023-01-05 NOTE — TELEPHONE ENCOUNTER
Called pt and told him orders were in his chart.   He is going to call somewhere near [place] to get a lab only appt scheduled    Order/Referral Request    Who is requesting: Patient    Orders being requested: lab orders for yearly physical that he is having done tomorrow at 2:00pm.    Reason service is needed/diagnosis:  PT wants to come in the morning and do fasting labs.     When are orders needed by: by tomorrow morning.    Has this been discussed with Provider: No    Does patient have a preference on a Group/Provider/Facility? Pt will have lab work done at [facility]. Please call Pt to help him schedule a lab only appointment.    Does patient have an appointment scheduled?: No    Where to send orders: EP[facility]    [facility] you [facility] or would you prefer to receive a phone call?:   Patient would prefer a phone call   Okay to leave a detailed message?: Yes at Cell number on file:    Telephone Information:   Mobile [phone]     Meghan Mcnamara

## 2023-01-06 ENCOUNTER — OFFICE VISIT (OUTPATIENT)
Dept: INTERNAL MEDICINE | Facility: CLINIC | Age: 58
End: 2023-01-06
Payer: COMMERCIAL

## 2023-01-06 ENCOUNTER — LAB (OUTPATIENT)
Dept: LAB | Facility: CLINIC | Age: 58
End: 2023-01-06
Payer: COMMERCIAL

## 2023-01-06 VITALS
TEMPERATURE: 98.1 F | SYSTOLIC BLOOD PRESSURE: 101 MMHG | HEIGHT: 70 IN | OXYGEN SATURATION: 100 % | DIASTOLIC BLOOD PRESSURE: 63 MMHG | HEART RATE: 76 BPM | RESPIRATION RATE: 16 BRPM | WEIGHT: 162.7 LBS | BODY MASS INDEX: 23.29 KG/M2

## 2023-01-06 DIAGNOSIS — Z00.00 ROUTINE GENERAL MEDICAL EXAMINATION AT A HEALTH CARE FACILITY: Primary | ICD-10-CM

## 2023-01-06 DIAGNOSIS — G57.11 MERALGIA PARESTHETICA OF RIGHT SIDE: ICD-10-CM

## 2023-01-06 DIAGNOSIS — F90.2 ATTENTION DEFICIT HYPERACTIVITY DISORDER (ADHD), COMBINED TYPE: ICD-10-CM

## 2023-01-06 DIAGNOSIS — G25.81 RESTLESS LEGS SYNDROME (RLS): ICD-10-CM

## 2023-01-06 DIAGNOSIS — Z00.00 HEALTHCARE MAINTENANCE: ICD-10-CM

## 2023-01-06 PROBLEM — S83.519A COMPLETE TEAR, KNEE, ANTERIOR CRUCIATE LIGAMENT: Status: RESOLVED | Noted: 2021-10-01 | Resolved: 2023-01-06

## 2023-01-06 LAB
ALBUMIN SERPL-MCNC: 3.9 G/DL (ref 3.4–5)
ALBUMIN UR-MCNC: NEGATIVE MG/DL
ALP SERPL-CCNC: 54 U/L (ref 40–150)
ALT SERPL W P-5'-P-CCNC: 22 U/L (ref 0–70)
ANION GAP SERPL CALCULATED.3IONS-SCNC: 5 MMOL/L (ref 3–14)
APPEARANCE UR: CLEAR
AST SERPL W P-5'-P-CCNC: 17 U/L (ref 0–45)
BILIRUB SERPL-MCNC: 0.8 MG/DL (ref 0.2–1.3)
BILIRUB UR QL STRIP: NEGATIVE
BUN SERPL-MCNC: 22 MG/DL (ref 7–30)
CALCIUM SERPL-MCNC: 8.6 MG/DL (ref 8.5–10.1)
CHLORIDE BLD-SCNC: 105 MMOL/L (ref 94–109)
CHOLEST SERPL-MCNC: 182 MG/DL
CO2 SERPL-SCNC: 29 MMOL/L (ref 20–32)
COLOR UR AUTO: NORMAL
CREAT SERPL-MCNC: 0.91 MG/DL (ref 0.66–1.25)
ERYTHROCYTE [DISTWIDTH] IN BLOOD BY AUTOMATED COUNT: 12.6 % (ref 10–15)
FASTING STATUS PATIENT QL REPORTED: YES
GFR SERPL CREATININE-BSD FRML MDRD: >90 ML/MIN/1.73M2
GLUCOSE BLD-MCNC: 97 MG/DL (ref 70–99)
GLUCOSE UR STRIP-MCNC: NEGATIVE MG/DL
HCT VFR BLD AUTO: 44.3 % (ref 40–53)
HDLC SERPL-MCNC: 64 MG/DL
HGB BLD-MCNC: 15.3 G/DL (ref 13.3–17.7)
HGB UR QL STRIP: NEGATIVE
KETONES UR STRIP-MCNC: NEGATIVE MG/DL
LDLC SERPL CALC-MCNC: 107 MG/DL
LEUKOCYTE ESTERASE UR QL STRIP: NEGATIVE
MCH RBC QN AUTO: 30.7 PG (ref 26.5–33)
MCHC RBC AUTO-ENTMCNC: 34.5 G/DL (ref 31.5–36.5)
MCV RBC AUTO: 89 FL (ref 78–100)
NITRATE UR QL: NEGATIVE
NONHDLC SERPL-MCNC: 118 MG/DL
PH UR STRIP: 6 [PH] (ref 5–7)
PLATELET # BLD AUTO: 226 10E3/UL (ref 150–450)
POTASSIUM BLD-SCNC: 4.5 MMOL/L (ref 3.4–5.3)
PROT SERPL-MCNC: 6.9 G/DL (ref 6.8–8.8)
PSA SERPL-MCNC: 0.75 NG/ML (ref 0–3.5)
RBC # BLD AUTO: 4.99 10E6/UL (ref 4.4–5.9)
SODIUM SERPL-SCNC: 139 MMOL/L (ref 133–144)
SP GR UR STRIP: 1.02 (ref 1–1.03)
TRIGL SERPL-MCNC: 56 MG/DL
UROBILINOGEN UR STRIP-MCNC: NORMAL MG/DL
WBC # BLD AUTO: 4.5 10E3/UL (ref 4–11)

## 2023-01-06 PROCEDURE — 80053 COMPREHEN METABOLIC PANEL: CPT

## 2023-01-06 PROCEDURE — 80061 LIPID PANEL: CPT

## 2023-01-06 PROCEDURE — 99215 OFFICE O/P EST HI 40 MIN: CPT | Mod: 25 | Performed by: INTERNAL MEDICINE

## 2023-01-06 PROCEDURE — 0124A COVID-19 VACCINE BIVALENT BOOSTER 12+ (PFIZER): CPT | Performed by: INTERNAL MEDICINE

## 2023-01-06 PROCEDURE — 90471 IMMUNIZATION ADMIN: CPT | Performed by: INTERNAL MEDICINE

## 2023-01-06 PROCEDURE — 36415 COLL VENOUS BLD VENIPUNCTURE: CPT

## 2023-01-06 PROCEDURE — 96127 BRIEF EMOTIONAL/BEHAV ASSMT: CPT | Performed by: INTERNAL MEDICINE

## 2023-01-06 PROCEDURE — 99396 PREV VISIT EST AGE 40-64: CPT | Mod: 25 | Performed by: INTERNAL MEDICINE

## 2023-01-06 PROCEDURE — 90682 RIV4 VACC RECOMBINANT DNA IM: CPT | Performed by: INTERNAL MEDICINE

## 2023-01-06 PROCEDURE — 85027 COMPLETE CBC AUTOMATED: CPT

## 2023-01-06 PROCEDURE — G0103 PSA SCREENING: HCPCS

## 2023-01-06 PROCEDURE — 81003 URINALYSIS AUTO W/O SCOPE: CPT

## 2023-01-06 PROCEDURE — 91312 COVID-19 VACCINE BIVALENT BOOSTER 12+ (PFIZER): CPT | Performed by: INTERNAL MEDICINE

## 2023-01-06 RX ORDER — GABAPENTIN 100 MG/1
100-300 CAPSULE ORAL
Qty: 270 CAPSULE | Refills: 3 | Status: SHIPPED | OUTPATIENT
Start: 2023-01-06 | End: 2024-01-02

## 2023-01-06 RX ORDER — GABAPENTIN 300 MG/1
300-600 CAPSULE ORAL AT BEDTIME
Qty: 180 CAPSULE | Refills: 3 | Status: SHIPPED | OUTPATIENT
Start: 2023-01-06 | End: 2024-01-09

## 2023-01-06 RX ORDER — DEXTROAMPHETAMINE SACCHARATE, AMPHETAMINE ASPARTATE MONOHYDRATE, DEXTROAMPHETAMINE SULFATE AND AMPHETAMINE SULFATE 2.5; 2.5; 2.5; 2.5 MG/1; MG/1; MG/1; MG/1
10 CAPSULE, EXTENDED RELEASE ORAL DAILY
Qty: 30 CAPSULE | Refills: 0 | Status: SHIPPED | OUTPATIENT
Start: 2023-01-06 | End: 2023-02-07

## 2023-01-06 RX ORDER — KETOCONAZOLE 20 MG/G
CREAM TOPICAL
COMMUNITY
Start: 2022-12-09

## 2023-01-06 RX ORDER — CICLOPIROX 80 MG/ML
SOLUTION TOPICAL
COMMUNITY
Start: 2022-12-09

## 2023-01-06 ASSESSMENT — ENCOUNTER SYMPTOMS
HEMATOCHEZIA: 0
DYSURIA: 0
PARESTHESIAS: 1
NERVOUS/ANXIOUS: 0
ABDOMINAL PAIN: 0
NAUSEA: 0
JOINT SWELLING: 0
ARTHRALGIAS: 0
CONSTIPATION: 0
COUGH: 0
DIARRHEA: 0
FREQUENCY: 0
EYE PAIN: 0
CHILLS: 0
WEAKNESS: 0
HEARTBURN: 0
HEMATURIA: 0
SHORTNESS OF BREATH: 0
FEVER: 0
SORE THROAT: 0
PALPITATIONS: 0
DIZZINESS: 0

## 2023-01-06 ASSESSMENT — ANXIETY QUESTIONNAIRES
GAD7 TOTAL SCORE: 3
6. BECOMING EASILY ANNOYED OR IRRITABLE: MORE THAN HALF THE DAYS
1. FEELING NERVOUS, ANXIOUS, OR ON EDGE: NOT AT ALL
GAD7 TOTAL SCORE: 3
3. WORRYING TOO MUCH ABOUT DIFFERENT THINGS: SEVERAL DAYS
7. FEELING AFRAID AS IF SOMETHING AWFUL MIGHT HAPPEN: NOT AT ALL
2. NOT BEING ABLE TO STOP OR CONTROL WORRYING: NOT AT ALL
5. BEING SO RESTLESS THAT IT IS HARD TO SIT STILL: NOT AT ALL

## 2023-01-06 ASSESSMENT — PATIENT HEALTH QUESTIONNAIRE - PHQ9: 5. POOR APPETITE OR OVEREATING: NOT AT ALL

## 2023-01-06 NOTE — PATIENT INSTRUCTIONS
Preventive Health Recommendations  Male Ages 50 - 64    Yearly exam:             See your health care provider every year in order to  o   Review health changes.   o   Discuss preventive care.    o   Review your medicines if your doctor has prescribed any.   Cholesterol screening  Diabetes screening  Colonoscopy should be repeated in 2025 for colon cancer screening  Annual PSA for prostate cancer screening  You should be tested each year for STDs (sexually transmitted diseases), if you re at risk.     Shots: Get a flu shot each year. Get a tetanus shot every 10 years.     Nutrition:  Eat at least 5 servings of fruits and vegetables daily.   Eat whole-grain bread, whole-wheat pasta and brown rice instead of white grains and rice.   Get adequate Calcium and Vitamin D.     Lifestyle  Exercise for at least 150 minutes a week (30 minutes a day, 5 days a week). This will help you control your weight and prevent disease.   Limit alcohol to one drink per day.   No smoking.   Wear sunscreen to prevent skin cancer.  Continue to see her dermatologist every 6 to 12 months  See your dentist every six months for an exam and cleaning.   See your eye doctor every 1 to 2 years.

## 2023-01-06 NOTE — PROGRESS NOTES
SUBJECTIVE:   CC: Max is an 57 year old who presents for preventative health visit.     SNO-49-iugr-old male here for annual preventive visit and to follow-up medical problems including restless leg syndrome and meralgia paresthetica.  We also discussed the diagnosis of ADHD.  See assessment and plan for details.    Patient has been advised of split billing requirements and indicates understanding: Yes       63 minutes spent in the management of this patient including reviewing records, obtaining history, performing exam, ordering appropriate tests and documenting visit.    20 of those minutes spent addressing preventive care and 43 minutes spent addressing chronic and new medical problems.  See assessment plan for details.      Healthy Habits:     Getting at least 3 servings of Calcium per day:  Yes    Bi-annual eye exam:  Yes    Dental care twice a year:  Yes    Sleep apnea or symptoms of sleep apnea:  None    Diet:  Regular (no restrictions)    Frequency of exercise:  6-7 days/week    Duration of exercise:  Greater than 60 minutes    Taking medications regularly:  Yes    Medication side effects:  Not applicable    PHQ-2 Total Score: 0    Additional concerns today:  No              Today's PHQ-2 Score:   PHQ-2 ( 1999 [employer]) 1/6/2023   Q1: Little interest or pleasure in doing things 0   Q2: Feeling down, depressed or hopeless 0   PHQ-2 Score 0   PHQ-2 Total Score (12-17 Years)- Positive if 3 or more points; Administer PHQ-A if positive -   Q1: Little interest or pleasure in doing things Not at all   Q2: Feeling down, depressed or hopeless Not at all   PHQ-2 Score 0           Social History     Tobacco Use     Smoking status: Never     Smokeless tobacco: Never   Substance Use Topics     Alcohol use: Yes     Comment: 1-2/ night     If you drink alcohol do you typically have >3 drinks per day or >7 drinks per week? No    Alcohol Use 1/6/2023   Prescreen: >3 drinks/day or >7 drinks/week? No       Last PSA:   Prostate  Specific Antigen Screen   Date Value Ref Range Status   10/01/2021 0.61 0.00 - 3.50 ug/L Final       Reviewed orders with patient. Reviewed health maintenance and updated orders accordingly - Yes  Lab work is in process    Reviewed and updated as needed this visit by clinical staff   Tobacco  Allergies  Meds              Reviewed and updated as needed this visit by Provider                 Past Medical History:   Diagnosis Date     Abdominal pain, left upper quadrant     EGD normal  2012     Attention deficit hyperactivity disorder (ADHD), combined type 1/6/2023     Bat bite wound 01/01/2020    Possible parathyroid.  Underwent rabies vaccination     Cervical disc herniation      Chronic LBP      Chronic left shoulder pain     labral tear     Chronic maxillary sinusitis     ENT evaluation February 2015     Chronic sore throat 09/28/2020     Complete tear, knee, anterior cruciate ligament 10/01/2021     Eczema      Facial trauma 01/01/2015     Globus sensation 01/01/2014    normal ENT evaluation     Influenza A 01/01/2014     Meralgia paresthetica of right side 09/28/2020     Pain of right thigh 04/29/2021     Recurrent pneumonia     IgG level normal 2015     Restless legs syndrome (RLS) 10/01/2021     Right hamstring injury 05/26/2016     Rupture of anterior cruciate ligament of right knee 04/29/2021    surgery 2021     Tinnitus 01/01/2012    ENT evaluation      Past Surgical History:   Procedure Laterality Date     ARTHROSCOPIC REPAIR ACL Right     2021     COLONOSCOPY      Normal May 2015     SKIN SURGERY      Birthmark removal     WISDOM TOOTH EXTRACTION         Review of Systems   Constitutional: Negative for chills and fever.   HENT: Negative for congestion, ear pain, hearing loss and sore throat.    Eyes: Negative for pain and visual disturbance.   Respiratory: Negative for cough and shortness of breath.    Cardiovascular: Negative for chest pain, palpitations and peripheral edema.   Gastrointestinal:  "Negative for abdominal pain, constipation, diarrhea, heartburn, hematochezia and nausea.   Genitourinary: Negative for dysuria, frequency, genital sores, hematuria, impotence, penile discharge and urgency.   Musculoskeletal: Negative for arthralgias and joint swelling.   Skin: Negative for rash.   Neurological: Positive for paresthesias. Negative for dizziness and weakness.   Psychiatric/Behavioral: Negative for mood changes. The patient is not nervous/anxious.          OBJECTIVE:   /63   Pulse 76   Temp 98.1  F (36.7  C) (Oral)   Resp 16   Ht 1.778 m (5' 10\")   Wt 73.8 kg (162 lb 11.2 oz)   SpO2 100%   BMI 23.35 kg/m      Physical Exam  EYES: Eyelids, conjunctiva, and sclera were normal. Pupils were normal. Cornea, iris, and lens were normal bilaterally.  HEAD, EARS, NOSE, MOUTH, AND THROAT: Head and face were normal. TMs and external auditory canals are normal  NECK: Neck appearance was normal. There were no neck masses and the thyroid was not enlarged and no nodules are felt.  No lymphadenopathy.  RESPIRATORY: Breathing pattern was normal and the chest moved symmetrically.   Lung sounds were normal and there were no rales or wheezes.  CARDIOVASCULAR: Heart rate and rhythm were normal.  S1 and S2 were normal and there were no extra sounds or murmurs. Peripheral pulses in arms and legs were normal.  There was no peripheral edema.  No carotid bruits.  GASTROINTESTINAL:  Bowel sounds were present.   Palpation detected no tenderness, mass, or enlarged organs.   MUSCULOSKELETAL: Skeletal configuration was normal and muscle mass was normal for age. Joint appearance was overall normal.  LYMPHATIC: There were no enlarged nodes.  SKIN/HAIR/NAILS: Skin color was normal.  There were no concerning skin lesions.  NEUROLOGIC: The patient was alert and oriented to person, place, time, and circumstance. Speech was normal. Cranial nerves were normal. Motor strength was normal for age. The patient was normally " coordinated.  Sensation intact.  PSYCHIATRIC:  Mood and affect were normal and the patient had normal recent and remote memory. The patient's judgment and insight were normal.  DARBY-7 score is 3    ADHD self-report scale highly suggestive of diagnoses ADHD        ASSESSMENT/PLAN:   1. Routine general medical examination at a health care facility  Immunizations are reviewed and providing flu shot and COVID boosters today.  Living will discussed.  Non-smoker.  Uses alcohol in moderation.  Regular exercise and healthy diet habits to maintain a healthy weight discussed.  Up to date with colonoscopies and this should be repeated in 2025.  Prostate exam is deferred but I will check a PSA for prostate cancer screening.   He sees his ophthalmologist every year.  Regular dental visits every 6 months discussed.  Skin exam performed and recommending regular use of sunblock.  Hepatitis C antibody for screening was normal.  Screening for diabetes with fasting glucose.  Checking fasting lipid profile.      2. Attention deficit hyperactivity disorder (ADHD), combined type  He was diagnosed with ADHD during adolescence and was briefly on medication but has not used medication throughout his adult life.  Although he has been a high achiever throughout adulthood, he still struggles to maintain focus and concentration with his work.  His son was recently diagnosed with ADHD and out of curiosity took one of his sons methylphenidate pills to see the response.  He was amazed at how well he felt being able to stay focused throughout the day.  I had him complete an ADHD self-report scale and his answers are highly suggestive of him having the diagnosis of ADHD.  He reports that very often he has trouble wrapping up final details of a project, has trouble getting things in order when he has a task that requires organization, and often has problems remembering appointments or obligations and when he has a task that requires a lot of thought,  he often avoids or delays getting started.  Multiple other responses throughout the assessment also supporting the diagnosis.  We discussed whether any underlying anxiety might be contributing.  He does have a DARBY-7 score of 3 but this does not seem like the main issue.  I will discuss with him further but I think he would likely benefit from taking medication and he could be started on Adderall XR 10 mg daily.  He has no history of hypertension or cardiac disease.  I would recommend the lowest effective dose of the medication to minimize any side effects.      I spoke with Bill after the visit and we will start Adderall XR 10 mg daily.  Reassess in 3 months.  He will report any side effects that occur in the meantime.    3. Meralgia paresthetica of right side  Ongoing burning and tingling sensation with itching in his right anterior thigh consistent with meralgia paresthetica.  He is using gabapentin.  Steroid injections may be an option.    4. Restless legs syndrome (RLS)  Restless legs well controlled taking gabapentin.  He is trying to work himself down to the lowest dose that is effective but has found that anything less than 300 mg is not working.  - gabapentin (NEURONTIN) 300 MG capsule; Take 1-2 capsules (300-600 mg) by mouth At Bedtime  Dispense: 180 capsule; Refill: 3  - gabapentin (NEURONTIN) 100 MG capsule; Take 1-3 capsules (100-300 mg) by mouth nightly as needed for other (Restless legs)  Dispense: 270 capsule; Refill: 3    Patient has been advised of split billing requirements and indicates understanding: Yes            He reports that he has never smoked. He has never used smokeless tobacco.        Ash Guardado MD  [facility]

## 2023-02-06 ENCOUNTER — MYC MEDICAL ADVICE (OUTPATIENT)
Dept: INTERNAL MEDICINE | Facility: CLINIC | Age: 58
End: 2023-02-06
Payer: COMMERCIAL

## 2023-02-06 DIAGNOSIS — F90.2 ATTENTION DEFICIT HYPERACTIVITY DISORDER (ADHD), COMBINED TYPE: Primary | ICD-10-CM

## 2023-02-07 RX ORDER — DEXTROAMPHETAMINE SACCHARATE, AMPHETAMINE ASPARTATE, DEXTROAMPHETAMINE SULFATE AND AMPHETAMINE SULFATE 2.5; 2.5; 2.5; 2.5 MG/1; MG/1; MG/1; MG/1
10 TABLET ORAL DAILY
Qty: 30 TABLET | Refills: 0 | Status: SHIPPED | OUTPATIENT
Start: 2023-02-07 | End: 2023-03-21

## 2023-02-08 ENCOUNTER — HOSPITAL ENCOUNTER (OUTPATIENT)
Dept: CARDIOLOGY | Facility: CLINIC | Age: 58
Discharge: HOME | End: 2023-02-08
Attending: INTERNAL MEDICINE | Admitting: INTERNAL MEDICINE
Payer: COMMERCIAL

## 2023-02-08 DIAGNOSIS — Z13.6 SCREENING FOR HEART DISEASE: ICD-10-CM

## 2023-02-08 PROCEDURE — 75571 CT HRT W/O DYE W/CA TEST: CPT | Mod: 26 | Performed by: INTERNAL MEDICINE

## 2023-02-08 PROCEDURE — 75571 CT HRT W/O DYE W/CA TEST: CPT | Mod: GA

## 2023-02-10 DIAGNOSIS — Z51.81 ENCOUNTER FOR THERAPEUTIC DRUG MONITORING: ICD-10-CM

## 2023-02-10 DIAGNOSIS — R93.1 ELEVATED CORONARY ARTERY CALCIUM SCORE: Primary | ICD-10-CM

## 2023-02-10 RX ORDER — ROSUVASTATIN CALCIUM 20 MG/1
20 TABLET, COATED ORAL DAILY
Qty: 90 TABLET | Refills: 3 | Status: SHIPPED | OUTPATIENT
Start: 2023-02-10 | End: 2024-01-31

## 2023-02-10 NOTE — PROGRESS NOTES
Surprisingly found to have elevated coronary calcium score of 130 despite lack of risk factors.  Recommending starting atorvastatin 20 mg daily along with 81 mg aspirin.  He should return in 3 months to have lipid profile and LFTs checked.  Potential side effects of statin including muscle ache and liver toxicity explained.  Video visit if he has further questions.

## 2023-02-23 ENCOUNTER — VIRTUAL VISIT (OUTPATIENT)
Dept: INTERNAL MEDICINE | Facility: CLINIC | Age: 58
End: 2023-02-23
Payer: COMMERCIAL

## 2023-02-23 DIAGNOSIS — R93.1 ELEVATED CORONARY ARTERY CALCIUM SCORE: Primary | ICD-10-CM

## 2023-02-23 DIAGNOSIS — F90.2 ATTENTION DEFICIT HYPERACTIVITY DISORDER (ADHD), COMBINED TYPE: ICD-10-CM

## 2023-02-23 DIAGNOSIS — G25.81 RESTLESS LEGS SYNDROME (RLS): ICD-10-CM

## 2023-02-23 PROBLEM — L30.9 ECZEMA: Status: RESOLVED | Noted: 2020-08-10 | Resolved: 2023-02-23

## 2023-02-23 PROBLEM — J31.2 CHRONIC SORE THROAT: Status: RESOLVED | Noted: 2020-09-28 | Resolved: 2023-02-23

## 2023-02-23 PROBLEM — G89.29 CHRONIC LOW BACK PAIN: Status: RESOLVED | Noted: 2020-08-10 | Resolved: 2023-02-23

## 2023-02-23 PROBLEM — M54.50 CHRONIC LOW BACK PAIN: Status: RESOLVED | Noted: 2020-08-10 | Resolved: 2023-02-23

## 2023-02-23 PROCEDURE — 99214 OFFICE O/P EST MOD 30 MIN: CPT | Mod: VID | Performed by: INTERNAL MEDICINE

## 2023-02-23 NOTE — PROGRESS NOTES
Max is a 57 year old who is being evaluated via a billable video visit.      How would you like to obtain your AVS? MyChart  If the video visit is dropped, the invitation should be resent by: Text to cell phone: [phone]  Will anyone else be joining your video visit? No          Assessment & Plan     Elevated coronary artery calcium score  57-year-old male who recently completed a CT coronary calcium score was found to have an elevated score of 130 placing him in the 79th percentile compared to men his age.  This was not unexpected finding as he does not have many risk factors for premature coronary artery disease.  He has never been a smoker.  No hypertension.  Cholesterol has been generally well controlled including a recent LDL cholesterol of 107 and total cholesterol of 182.  His HDL is 64.  He exercises on a regular basis.  He watches his diet extremely carefully although may have had more indiscretions in his diet in his younger years.  There is also some family history of premature heart disease with grandfathers having heart problems in their 60s and 70s.  He has at least mild if not moderate atherosclerosis and is at high risk for having future cardiovascular events without further intervention.  For this reason, I have recommended starting rosuvastatin 20 mg daily along with aspirin 81 mg daily.  We discussed the rationale for taking both of these medications.  We will plan to recheck his lipid profile and LFTs along with LP(a) in 3 months.  We discussed potential side effects of the statin including myalgia.  So far he has tolerated the medication without any difficulty.  - Lipoprotein (a); Future    Attention deficit hyperactivity disorder (ADHD), combined type  Recently started Adderall to help with ADHD symptoms.  The medication has been helpful but he was having troubles with insomnia taking the XR version of Adderall.  Now using Adderall IR 10 mg but still having slight issues with insomnia.   This may limit the ability to titrate up his dose.  We will reassess in 6 weeks.    Restless legs syndrome (RLS)  He has found that he needs to continue using gabapentin to control his restless leg symptoms and it is also helpful for his insomnia.        31 minutes spent on the date of the encounter doing chart review, history and exam, documentation and further activities per the note           Return in about 6 weeks (around 4/6/2023) for Follow up.    Ash Guardado MD  [facility]    Anitra Santana is a 57 year old, presenting for the following health issues: Video visit performed today to address elevated coronary calcium score and to also discuss ADHD and response to Adderall.  See assessment and plan for details  Results      History of Present Illness       Reason for visit:  Follow to calcium screen    He eats 2-3 servings of fruits and vegetables daily.He consumes 1 sweetened beverage(s) daily.He exercises with enough effort to increase his heart rate 30 to 60 minutes per day.  He exercises with enough effort to increase his heart rate 7 days per week.   He is taking medications regularly.         Review of Systems   No history of exertional chest pain      Objective           Vitals:  No vitals were obtained today due to virtual visit.    Physical Exam   Well-appearing middle-age male    Video-Visit Details    Type of service:  Video Visit     Originating Location (pt. Location): Home  Distant Location (provider location):  On-site  Platform used for Video Visit: Muzzley

## 2023-03-21 ENCOUNTER — MYC REFILL (OUTPATIENT)
Dept: INTERNAL MEDICINE | Facility: CLINIC | Age: 58
End: 2023-03-21
Payer: COMMERCIAL

## 2023-03-21 DIAGNOSIS — F90.2 ATTENTION DEFICIT HYPERACTIVITY DISORDER (ADHD), COMBINED TYPE: ICD-10-CM

## 2023-03-22 RX ORDER — DEXTROAMPHETAMINE SACCHARATE, AMPHETAMINE ASPARTATE, DEXTROAMPHETAMINE SULFATE AND AMPHETAMINE SULFATE 2.5; 2.5; 2.5; 2.5 MG/1; MG/1; MG/1; MG/1
10 TABLET ORAL DAILY
Qty: 30 TABLET | Refills: 0 | Status: SHIPPED | OUTPATIENT
Start: 2023-03-22 | End: 2023-04-20

## 2023-04-06 ENCOUNTER — VIRTUAL VISIT (OUTPATIENT)
Dept: INTERNAL MEDICINE | Facility: CLINIC | Age: 58
End: 2023-04-06
Payer: COMMERCIAL

## 2023-04-06 DIAGNOSIS — G25.81 RESTLESS LEGS SYNDROME (RLS): ICD-10-CM

## 2023-04-06 DIAGNOSIS — R93.1 ELEVATED CORONARY ARTERY CALCIUM SCORE: ICD-10-CM

## 2023-04-06 DIAGNOSIS — G57.11 MERALGIA PARESTHETICA OF RIGHT SIDE: ICD-10-CM

## 2023-04-06 DIAGNOSIS — F90.2 ATTENTION DEFICIT HYPERACTIVITY DISORDER (ADHD), COMBINED TYPE: Primary | ICD-10-CM

## 2023-04-06 PROBLEM — M79.651 PAIN OF RIGHT THIGH: Status: RESOLVED | Noted: 2021-04-29 | Resolved: 2023-04-06

## 2023-04-06 PROCEDURE — 99214 OFFICE O/P EST MOD 30 MIN: CPT | Mod: VID | Performed by: INTERNAL MEDICINE

## 2023-04-06 NOTE — PROGRESS NOTES
Max is a 58 year old who is being evaluated via a billable video visit.      How would you like to obtain your AVS? MyChart  If the video visit is dropped, the invitation should be resent by: Text to cell phone: [phone]  Will anyone else be joining your video visit? No        Assessment & Plan     Attention deficit hyperactivity disorder (ADHD), combined type  ADHD symptoms throughout life, untreated.  Recently started Adderall and has found this very helpful.  Extended release version was causing insomnia.  Doing better with 10 mg of Adderall IR.  Effects are most beneficial in the first few hours after taking the medication and do wear off later in the afternoon but I am reluctant to add a second dose as this could increase problems with insomnia.  We will continue current dose for now.    Elevated coronary artery calcium score  CT coronary calcium score of 130 placing him in the 79th percentile compared to men his age.  Suggest at least mild or moderate atherosclerosis.  Now on rosuvastatin 20 mg daily along with aspirin 81 mg daily and tolerating medications without any side effects.  We discussed that this should significantly lower his risk for future cardiovascular events.  We will plan to recheck lipid profile, LFTs and LP(a) in 3 months.    Meralgia paresthetica of right side  Continues gabapentin and ibuprofen    Restless legs syndrome (RLS)  Continues gabapentin                   Ash Guardado MD  [facility]    Subjective   Max is a 58 year old, presenting for the following health issues: Discussed ADHD and elevated coronary calcium score during today's video visit.  Follow Up (Medication review, started on statin and aspirin)        1/6/2023     2:09 PM   Additional Questions   Roomed by Sangita STONE             Review of Systems   No CP        Objective           Vitals:  No vitals were obtained today due to virtual visit.    Physical Exam     Well  appearing middle aged male      Video-Visit Details    Type of service:  Video Visit     Originating Location (pt. Location): Home  Distant Location (provider location):  On-site  Platform used for Video Visit: Dina

## 2023-04-20 ENCOUNTER — MYC REFILL (OUTPATIENT)
Dept: INTERNAL MEDICINE | Facility: CLINIC | Age: 58
End: 2023-04-20
Payer: COMMERCIAL

## 2023-04-20 DIAGNOSIS — F90.2 ATTENTION DEFICIT HYPERACTIVITY DISORDER (ADHD), COMBINED TYPE: ICD-10-CM

## 2023-04-21 RX ORDER — DEXTROAMPHETAMINE SACCHARATE, AMPHETAMINE ASPARTATE, DEXTROAMPHETAMINE SULFATE AND AMPHETAMINE SULFATE 2.5; 2.5; 2.5; 2.5 MG/1; MG/1; MG/1; MG/1
10 TABLET ORAL DAILY
Qty: 30 TABLET | Refills: 0 | Status: SHIPPED | OUTPATIENT
Start: 2023-04-21 | End: 2023-06-23

## 2023-04-21 NOTE — TELEPHONE ENCOUNTER
Routing refill request to provider for review/approval because:  Drug not on the [facility] refill protocol     Last Written Prescription Date:  3/22/2023  Last Fill Quantity: 30,  # refills: 0   Last office visit provider:  4/6/2023     Requested Prescriptions   Pending Prescriptions Disp Refills     amphetamine-dextroamphetamine (ADDERALL) 10 MG tablet 30 tablet 0     Sig: Take 1 tablet (10 mg) by mouth daily       There is no refill protocol information for this order          Shanae Goldstein RN 04/21/23 12:37 AM

## 2023-06-01 ENCOUNTER — LAB (OUTPATIENT)
Dept: LAB | Facility: CLINIC | Age: 58
End: 2023-06-01
Payer: COMMERCIAL

## 2023-06-01 DIAGNOSIS — Z51.81 ENCOUNTER FOR THERAPEUTIC DRUG MONITORING: ICD-10-CM

## 2023-06-01 DIAGNOSIS — R93.1 ELEVATED CORONARY ARTERY CALCIUM SCORE: ICD-10-CM

## 2023-06-01 LAB
ALBUMIN SERPL BCG-MCNC: 4.3 G/DL (ref 3.5–5.2)
ALP SERPL-CCNC: 48 U/L (ref 40–129)
ALT SERPL W P-5'-P-CCNC: 22 U/L (ref 10–50)
APO A-I SERPL-MCNC: 20 MG/DL
AST SERPL W P-5'-P-CCNC: 26 U/L (ref 10–50)
BILIRUB DIRECT SERPL-MCNC: 0.24 MG/DL (ref 0–0.3)
BILIRUB SERPL-MCNC: 0.9 MG/DL
CHOLEST SERPL-MCNC: 117 MG/DL
HDLC SERPL-MCNC: 60 MG/DL
LDLC SERPL CALC-MCNC: 46 MG/DL
NONHDLC SERPL-MCNC: 57 MG/DL
PROT SERPL-MCNC: 6.5 G/DL (ref 6.4–8.3)
TRIGL SERPL-MCNC: 53 MG/DL

## 2023-06-01 PROCEDURE — 36415 COLL VENOUS BLD VENIPUNCTURE: CPT

## 2023-06-01 PROCEDURE — 80061 LIPID PANEL: CPT

## 2023-06-01 PROCEDURE — 83695 ASSAY OF LIPOPROTEIN(A): CPT

## 2023-06-01 PROCEDURE — 80076 HEPATIC FUNCTION PANEL: CPT

## 2023-06-05 DIAGNOSIS — R93.1 ELEVATED CORONARY ARTERY CALCIUM SCORE: Primary | ICD-10-CM

## 2023-06-23 ENCOUNTER — OFFICE VISIT (OUTPATIENT)
Dept: INTERNAL MEDICINE | Facility: CLINIC | Age: 58
End: 2023-06-23
Payer: COMMERCIAL

## 2023-06-23 VITALS
SYSTOLIC BLOOD PRESSURE: 100 MMHG | OXYGEN SATURATION: 98 % | WEIGHT: 158 LBS | TEMPERATURE: 97.7 F | HEIGHT: 70 IN | RESPIRATION RATE: 16 BRPM | HEART RATE: 84 BPM | BODY MASS INDEX: 22.62 KG/M2 | DIASTOLIC BLOOD PRESSURE: 62 MMHG

## 2023-06-23 DIAGNOSIS — H93.8X3 PLUGGED FEELING IN EAR, BILATERAL: Primary | ICD-10-CM

## 2023-06-23 DIAGNOSIS — F90.2 ATTENTION DEFICIT HYPERACTIVITY DISORDER (ADHD), COMBINED TYPE: ICD-10-CM

## 2023-06-23 DIAGNOSIS — R93.1 ELEVATED CORONARY ARTERY CALCIUM SCORE: ICD-10-CM

## 2023-06-23 PROCEDURE — 99214 OFFICE O/P EST MOD 30 MIN: CPT | Performed by: INTERNAL MEDICINE

## 2023-06-23 RX ORDER — CETIRIZINE HYDROCHLORIDE 10 MG/1
10 TABLET ORAL DAILY
COMMUNITY
Start: 2023-06-23 | End: 2023-07-07

## 2023-06-23 RX ORDER — DEXTROAMPHETAMINE SACCHARATE, AMPHETAMINE ASPARTATE, DEXTROAMPHETAMINE SULFATE AND AMPHETAMINE SULFATE 2.5; 2.5; 2.5; 2.5 MG/1; MG/1; MG/1; MG/1
10 TABLET ORAL DAILY
Qty: 30 TABLET | Refills: 0 | Status: SHIPPED | OUTPATIENT
Start: 2023-06-23 | End: 2023-10-06

## 2023-06-23 RX ORDER — FLUTICASONE PROPIONATE 50 MCG
2 SPRAY, SUSPENSION (ML) NASAL DAILY
Qty: 16 G | Refills: 11 | COMMUNITY
Start: 2023-06-23

## 2023-06-23 NOTE — PROGRESS NOTES
"  {PROVIDER CHARTING PREFERENCE:832714}    Subjective   Max is a 58 year old, presenting for the following health issues:  Ear Problem (Ears feel plugged x 3 weeks, uses ear plugs at cycle class)        6/23/2023     1:57 PM   Additional Questions   Roomed by Armida MURPHY     History of Present Illness       Reason for visit:  Ear check up  Symptom onset:  3-4 weeks ago  Symptoms include:  Ear pressure, ringing in ears  Symptom intensity:  Mild  Symptom progression:  Staying the same  Had these symptoms before:  Yes  Has tried/received treatment for these symptoms:  Yes  Previous treatment was successful:  No  What makes it worse:  Loud noises  What makes it better:  No    He eats 2-3 servings of fruits and vegetables daily.He consumes 1 sweetened beverage(s) daily.He exercises with enough effort to increase his heart rate 60 or more minutes per day.  He exercises with enough effort to increase his heart rate 7 days per week.   He is taking medications regularly.       {SUPERLIST (Optional):236899}  {additonal problems for provider to add (Optional):094987}      Review of Systems   {ROS COMP (Optional):065146}      Objective    /62 (BP Location: Right arm, Patient Position: Sitting, Cuff Size: Adult Regular)   Pulse 84   Temp 97.7  F (36.5  C)   Resp 16   Ht 1.778 m (5' 10\")   Wt 71.7 kg (158 lb)   SpO2 98%   BMI 22.67 kg/m    Body mass index is 22.67 kg/m .  Physical Exam   {Exam List (Optional):007090}    {Diagnostic Test Results (Optional):951031}    {AMBULATORY ATTESTATION (Optional):988660}            "

## 2023-06-23 NOTE — PROGRESS NOTES
Assessment & Plan     Plugged feeling in ear, bilateral  58-year-old male here with concerns that he has wax accumulating in his ears as he has a plugged sensation bilaterally.  There is also some intermittent tinnitus.  Exam shows both external auditory canals to be normal and free of any cerumen.  TMs appear normal.  I suspect he is experiencing eustachian tube dysfunction or as allergies causing fluid accumulation in his sinuses.  I am recommending starting Flonase 2 sprays each nostril and he should continue for a minimum of 4-6 weeks.  If working well, he can continue indefinitely.  He should also start Zyrtec 10 mg daily for the next 2 weeks for more immediate relief of symptoms.  In regards to tinnitus, this is probably a separate issue.  We discussed that there are no good treatment options for tinnitus.  - fluticasone (FLONASE) 50 MCG/ACT nasal spray; Spray 2 sprays into both nostrils daily  - cetirizine (ZYRTEC) 10 MG tablet; Take 1 tablet (10 mg) by mouth daily for 14 days    Attention deficit hyperactivity disorder (ADHD), combined type  He is needing a refill of Adderall and we will send that to his pharmacy  - amphetamine-dextroamphetamine (ADDERALL) 10 MG tablet; Take 1 tablet (10 mg) by mouth daily    Elevated coronary artery calcium score  We reviewed his recent lipid results showing excellent response to rosuvastatin.  He is also watching his diet strictly but there are concerns that he may be restricting protein too much as his total protein level is on the low end of normal.  He is scheduled to meet with a nutritionist in August.    0956}     See Patient Instructions    MD JEFFREY Mandujano [facility]    Anitra Santana is a 58 year old, presenting for the following health issues:  Ear Problem (Ears feel plugged x 3 weeks, uses ear plugs at cycle class)        6/23/2023     1:57 PM   Additional Questions   Roomed by Armida MURPHY     History of Present Illness  "      Reason for visit:  Ear check up  Symptom onset:  3-4 weeks ago  Symptoms include:  Ear pressure, ringing in ears  Symptom intensity:  Mild  Symptom progression:  Staying the same  Had these symptoms before:  Yes  Has tried/received treatment for these symptoms:  Yes  Previous treatment was successful:  No  What makes it worse:  Loud noises  What makes it better:  No    He eats 2-3 servings of fruits and vegetables daily.He consumes 1 sweetened beverage(s) daily.He exercises with enough effort to increase his heart rate 60 or more minutes per day.  He exercises with enough effort to increase his heart rate 7 days per week.   He is taking medications regularly.               Review of Systems   No headaches      Objective    /62 (BP Location: Right arm, Patient Position: Sitting, Cuff Size: Adult Regular)   Pulse 84   Temp 97.7  F (36.5  C)   Resp 16   Ht 1.778 m (5' 10\")   Wt 71.7 kg (158 lb)   SpO2 98%   BMI 22.67 kg/m    Body mass index is 22.67 kg/m .  Physical Exam   Bilateral external auditory canals normal and both TMs normal in appearance        " Constitutional: Well appearing, well nourished, awake, alert, oriented to person, place, time/situation and in no apparent distress.  ENMT: Airway patent. Normal MM  Eyes: Clear bilaterally  Cardiac: Normal rate, regular rhythm.  Heart sounds S1, S2.  No murmurs, rubs or gallops. no JVD or LE edema  Respiratory: Breaths sounds equal and clear b/l. no W/R/R. No increased WOB, tachypnea, hypoxia, or accessory mm use. Pt speaks in full sentences.   Gastrointestinal: Abd soft, NT, ND, NABS. No guarding, rebound, or rigidity. No pulsatile abdominal masses. No organomegaly appreciated. No CVAT   : external exam only. hyperpigmentation, chronic appearing, to inner thighs where shaved. 1-2 minimal areas swelling around hair follicle on mons, not red, fluctuance, or indurated, non tender. L mons w/ 0.5 cm area ? scar tissue vs keloid. no induration / fluctuance/ erythema. skin colored. no ulcers or vesicles.   Musculoskeletal: Range of motion is not limited. no calf ttp.   Neuro: Alert and oriented x 3, face symmetric and speech fluent. Strength 5/5 x 4 ext and symmetric, nml gross motor movement, nml gait. No focal deficits noted.  Skin: Skin normal color for race, warm, dry and intact. No evidence of rash.  Psych: Alert and oriented to person, place, time/situation. normal mood and affect. no apparent risk to self or others.

## 2023-07-19 ENCOUNTER — VIRTUAL VISIT (OUTPATIENT)
Dept: ONCOLOGY | Facility: CLINIC | Age: 58
End: 2023-07-19
Attending: INTERNAL MEDICINE
Payer: COMMERCIAL

## 2023-07-19 DIAGNOSIS — R93.1 ELEVATED CORONARY ARTERY CALCIUM SCORE: ICD-10-CM

## 2023-07-19 PROCEDURE — 97802 MEDICAL NUTRITION INDIV IN: CPT | Mod: GT,95 | Performed by: DIETITIAN, REGISTERED

## 2023-07-19 NOTE — LETTER
"    7/19/2023         RE: Murray Piper  [address] [place] [address]        Dear Colleague,    Thank you for referring your patient, Murray Piper, to the [facility]. Please see a copy of my visit note below.    Video-Visit Details     Type of service:  Video Visit     Video Start Time (time video started): 7:53am     Video End Time (time video stopped): 8:36am    Originating Location (pt. Location): Home     Distant Location (provider location):  [facility] NUTRITION SERVICES      Mode of Communication:  [facility]      CLINICAL NUTRITION SERVICES - ASSESSMENT NOTE    Murray Piper 58 year old referred for MNT related to CAD    Time Spent: 42 minutes  Visit Type: video  Referring Physician: Geo 6/5/23  R93.1 (ICD-10-CM) - Elevated coronary artery calcium score    NUTRITION HISTORY  Current diet: general diet  Max has made heart healthy diet changes by reducing red meat to <3 times per month.  He has been incorporating salmon/seafood in his diet at least 3 times/week.    He is interested in learning how to increase his protein intake. He also inquires about fat intake for heart health.   Activity: 13,5000 steps/day (3 miles/day with dogs); Studio cycle 2-3 times/week; 2-3 days/week treadmill; body weight strength straining daily      Diet Recall  Breakfast - [employer] Morning summit granola cereal w/ 1% milk, fruit/veggie smoothie ([employer] fruit), Metamucil powder, coffee w/ 1/2 n 1/2  Lunch - sandwich w/ ham or turkey or peanut butter and jelly OR skips in the summer and has mixed nuts or granola bar or toast with avocado  Dinner - [place] 2-3 times/week w/mixed green OR chicken veg/pasta OR restaurant for Sushi or pasta and protein OR burrito bowls, salad bowls  Snacks - desserts  Beverages - Liquid IV water with workout and in the evening, 1-2 drinks    ANTHROPOMETRICS  Height: 70\"  Weight: 158 lbs/71kg  BMI: " 22  Weight Status:  Normal BMI  Weight History: stable  Wt Readings from Last 10 Encounters:   06/23/23 71.7 kg (158 lb)   01/06/23 73.8 kg (162 lb 11.2 oz)   10/01/21 74.3 kg (163 lb 12.8 oz)   04/29/21 74.4 kg (164 lb)   09/28/20 77.6 kg (171 lb)   01/22/20 77.1 kg (170 lb)   07/02/19 78.9 kg (174 lb)   06/01/17 78.5 kg (173 lb)   05/26/16 77.6 kg (171 lb)       Dosing Weight: 71kg    Medications/vitamins/minerals/herbals:   Reviewed    Labs:  Reviewed    ASSESSED NUTRITION NEEDS:  Estimated Energy Needs: 7017-9534 kcals (25-30 Kcal/Kg)  Justification: maintenance  Estimated Protein Needs: 85 grams protein (1-1.2 g pro/Kg)  Justification: maintenance    NUTRITION DIAGNOSIS:  No nutrition diagnosis identified at this time     INTERVENTIONS  Provided written & verbal education:   --Reviewed heart healthy fats versus saturated and trans fats.  Reviewed cereal/granola alternatives. Suggested trying Autums Gold granola from [employer] as it has <3.5g saturated fat/serving vs 9 in his current cereal.    --Reviewed sources of plant and animal proteins.  Encouraged to have a protein source with each meal and snack. Encouraged to have at least 10-15g protein 30-60 min post workout. Reviewed protein goals with activity. Encouraged to aim for at least 85 grams/day. Suggested trying [employer] protein shakes.     Provided pt with corresponding education materials/handouts on:  Sources of Protein, List of high protein foods    Pt verbalize understanding of materials provided during consult.   Patient Understanding: Excellent  Expected Compliance: Excellent     Goals  1.  Aim 85-100g protein/day     Follow-Up Plans: Pt has RD contact information for questions.      Cristina Owen RD, , LD

## 2023-07-19 NOTE — PROGRESS NOTES
"Video-Visit Details     Type of service:  Video Visit     Video Start Time (time video started): 7:53am     Video End Time (time video stopped): 8:36am    Originating Location (pt. Location): Home     Distant Location (provider location):  [facility] SERVICES      Mode of Communication:  Video Conference via [facility]      CLINICAL NUTRITION SERVICES - ASSESSMENT NOTE    Murray Piper 58 year old referred for MNT related to CAD    Time Spent: 42 minutes  Visit Type: video  Referring Physician: Geo 6/5/23  R93.1 (ICD-10-CM) - Elevated coronary artery calcium score    NUTRITION HISTORY  Current diet: general diet  Max has made heart healthy diet changes by reducing red meat to <3 times per month.  He has been incorporating salmon/seafood in his diet at least 3 times/week.    He is interested in learning how to increase his protein intake. He also inquires about fat intake for heart health.   Activity: 13,5000 steps/day (3 miles/day with dogs); Studio cycle 2-3 times/week; 2-3 days/week treadmill; body weight strength straining daily      Diet Recall  Breakfast - [employer] Morning summit granola cereal w/ 1% milk, fruit/veggie smoothie ([employer] fruit), Metamucil powder, coffee w/ 1/2 n 1/2  Lunch - sandwich w/ ham or turkey or peanut butter and jelly OR skips in the summer and has mixed nuts or granola bar or toast with avocado  Dinner - [place] 2-3 times/week w/mixed green OR chicken veg/pasta OR restaurant for Sushi or pasta and protein OR burrito bowls, salad bowls  Snacks - desserts  Beverages - Liquid IV water with workout and in the evening, 1-2 drinks    ANTHROPOMETRICS  Height: 70\"  Weight: 158 lbs/71kg  BMI: 22  Weight Status:  Normal BMI  Weight History: stable  Wt Readings from Last 10 Encounters:   06/23/23 71.7 kg (158 lb)   01/06/23 73.8 kg (162 lb 11.2 oz)   10/01/21 74.3 kg (163 lb 12.8 oz)   04/29/21 74.4 kg (164 lb)   09/28/20 77.6 kg (171 lb)   01/22/20 77.1 kg " (170 lb)   07/02/19 78.9 kg (174 lb)   06/01/17 78.5 kg (173 lb)   05/26/16 77.6 kg (171 lb)       Dosing Weight: 71kg    Medications/vitamins/minerals/herbals:   Reviewed    Labs:  Reviewed    ASSESSED NUTRITION NEEDS:  Estimated Energy Needs: 9887-5911 kcals (25-30 Kcal/Kg)  Justification: maintenance  Estimated Protein Needs: 85 grams protein (1-1.2 g pro/Kg)  Justification: maintenance    NUTRITION DIAGNOSIS:  No nutrition diagnosis identified at this time     INTERVENTIONS  Provided written & verbal education:   --Reviewed heart healthy fats versus saturated and trans fats.  Reviewed cereal/granola alternatives. Suggested trying Autums Gold granola from [employer] as it has <3.5g saturated fat/serving vs 9 in his current cereal.    --Reviewed sources of plant and animal proteins.  Encouraged to have a protein source with each meal and snack. Encouraged to have at least 10-15g protein 30-60 min post workout. Reviewed protein goals with activity. Encouraged to aim for at least 85 grams/day. Suggested trying [employer]life protein shakes.     Provided pt with corresponding education materials/handouts on:  Sources of Protein, List of high protein foods    Pt verbalize understanding of materials provided during consult.   Patient Understanding: Excellent  Expected Compliance: Excellent     Goals  1.  Aim 85-100g protein/day     Follow-Up Plans: Pt has RD contact information for questions.      Cristina Owen RD, , LD

## 2023-07-19 NOTE — PATIENT INSTRUCTIONS
Alfredo Santana,     I have attached the resources that I referred to during our conversation (see your email):   --High protein foods  --Sources of protein     Here are your protein goals: 85 -100 grams/day    Please let me know if you have any questions!    Be well,     Cristina Owen RD, , LD  [facility]  Outpatient  Services  [email or web address]   Office: [phone]  Fax: [phone]

## 2023-10-06 ENCOUNTER — MYC REFILL (OUTPATIENT)
Dept: INTERNAL MEDICINE | Facility: CLINIC | Age: 58
End: 2023-10-06
Payer: COMMERCIAL

## 2023-10-06 DIAGNOSIS — F90.2 ATTENTION DEFICIT HYPERACTIVITY DISORDER (ADHD), COMBINED TYPE: ICD-10-CM

## 2023-10-06 RX ORDER — DEXTROAMPHETAMINE SACCHARATE, AMPHETAMINE ASPARTATE, DEXTROAMPHETAMINE SULFATE AND AMPHETAMINE SULFATE 2.5; 2.5; 2.5; 2.5 MG/1; MG/1; MG/1; MG/1
10 TABLET ORAL DAILY
Qty: 30 TABLET | Refills: 0 | Status: SHIPPED | OUTPATIENT
Start: 2023-10-06 | End: 2024-01-09

## 2023-12-18 DIAGNOSIS — Z00.00 HEALTHCARE MAINTENANCE: ICD-10-CM

## 2023-12-18 DIAGNOSIS — R93.1 ELEVATED CORONARY ARTERY CALCIUM SCORE: Primary | ICD-10-CM

## 2023-12-29 ENCOUNTER — LAB (OUTPATIENT)
Dept: LAB | Facility: CLINIC | Age: 58
End: 2023-12-29
Payer: COMMERCIAL

## 2023-12-29 DIAGNOSIS — Z00.00 HEALTHCARE MAINTENANCE: ICD-10-CM

## 2023-12-29 DIAGNOSIS — R93.1 ELEVATED CORONARY ARTERY CALCIUM SCORE: ICD-10-CM

## 2023-12-29 LAB
ALBUMIN SERPL BCG-MCNC: 4.4 G/DL (ref 3.5–5.2)
ALBUMIN UR-MCNC: NEGATIVE MG/DL
ALP SERPL-CCNC: 56 U/L (ref 40–150)
ALT SERPL W P-5'-P-CCNC: 39 U/L (ref 0–70)
ANION GAP SERPL CALCULATED.3IONS-SCNC: 8 MMOL/L (ref 7–15)
APPEARANCE UR: CLEAR
AST SERPL W P-5'-P-CCNC: 32 U/L (ref 0–45)
BILIRUB SERPL-MCNC: 0.8 MG/DL
BILIRUB UR QL STRIP: NEGATIVE
BUN SERPL-MCNC: 13.7 MG/DL (ref 6–20)
CALCIUM SERPL-MCNC: 9 MG/DL (ref 8.6–10)
CHLORIDE SERPL-SCNC: 104 MMOL/L (ref 98–107)
CHOLEST SERPL-MCNC: 142 MG/DL
COLOR UR AUTO: YELLOW
CREAT SERPL-MCNC: 0.91 MG/DL (ref 0.67–1.17)
DEPRECATED HCO3 PLAS-SCNC: 29 MMOL/L (ref 22–29)
EGFRCR SERPLBLD CKD-EPI 2021: >90 ML/MIN/1.73M2
ERYTHROCYTE [DISTWIDTH] IN BLOOD BY AUTOMATED COUNT: 12.6 % (ref 10–15)
FASTING STATUS PATIENT QL REPORTED: YES
GLUCOSE SERPL-MCNC: 94 MG/DL (ref 70–99)
GLUCOSE UR STRIP-MCNC: NEGATIVE MG/DL
HCT VFR BLD AUTO: 45.2 % (ref 40–53)
HDLC SERPL-MCNC: 70 MG/DL
HGB BLD-MCNC: 15.5 G/DL (ref 13.3–17.7)
HGB UR QL STRIP: NEGATIVE
KETONES UR STRIP-MCNC: NEGATIVE MG/DL
LDLC SERPL CALC-MCNC: 63 MG/DL
LEUKOCYTE ESTERASE UR QL STRIP: NEGATIVE
MCH RBC QN AUTO: 30.5 PG (ref 26.5–33)
MCHC RBC AUTO-ENTMCNC: 34.3 G/DL (ref 31.5–36.5)
MCV RBC AUTO: 89 FL (ref 78–100)
NITRATE UR QL: NEGATIVE
NONHDLC SERPL-MCNC: 72 MG/DL
PH UR STRIP: 7 [PH] (ref 5–7)
PLATELET # BLD AUTO: 208 10E3/UL (ref 150–450)
POTASSIUM SERPL-SCNC: 4.9 MMOL/L (ref 3.4–5.3)
PROT SERPL-MCNC: 6.7 G/DL (ref 6.4–8.3)
PSA SERPL DL<=0.01 NG/ML-MCNC: 0.69 NG/ML (ref 0–3.5)
RBC # BLD AUTO: 5.08 10E6/UL (ref 4.4–5.9)
SODIUM SERPL-SCNC: 141 MMOL/L (ref 135–145)
SP GR UR STRIP: 1.01 (ref 1–1.03)
TRIGL SERPL-MCNC: 45 MG/DL
UROBILINOGEN UR STRIP-ACNC: 0.2 E.U./DL
WBC # BLD AUTO: 4.5 10E3/UL (ref 4–11)

## 2023-12-29 PROCEDURE — 81003 URINALYSIS AUTO W/O SCOPE: CPT

## 2023-12-29 PROCEDURE — 36415 COLL VENOUS BLD VENIPUNCTURE: CPT

## 2023-12-29 PROCEDURE — 85027 COMPLETE CBC AUTOMATED: CPT

## 2023-12-29 PROCEDURE — G0103 PSA SCREENING: HCPCS

## 2023-12-29 PROCEDURE — 80053 COMPREHEN METABOLIC PANEL: CPT

## 2023-12-29 PROCEDURE — 80061 LIPID PANEL: CPT

## 2024-01-02 DIAGNOSIS — G25.81 RESTLESS LEGS SYNDROME (RLS): ICD-10-CM

## 2024-01-02 RX ORDER — GABAPENTIN 100 MG/1
CAPSULE ORAL
Qty: 270 CAPSULE | Refills: 3 | Status: SHIPPED | OUTPATIENT
Start: 2024-01-02 | End: 2024-01-09

## 2024-01-09 ENCOUNTER — OFFICE VISIT (OUTPATIENT)
Dept: INTERNAL MEDICINE | Facility: CLINIC | Age: 59
End: 2024-01-09
Payer: COMMERCIAL

## 2024-01-09 VITALS
SYSTOLIC BLOOD PRESSURE: 108 MMHG | TEMPERATURE: 98.2 F | OXYGEN SATURATION: 98 % | WEIGHT: 165 LBS | HEART RATE: 68 BPM | BODY MASS INDEX: 23.62 KG/M2 | DIASTOLIC BLOOD PRESSURE: 70 MMHG | RESPIRATION RATE: 16 BRPM | HEIGHT: 70 IN

## 2024-01-09 DIAGNOSIS — F90.2 ATTENTION DEFICIT HYPERACTIVITY DISORDER (ADHD), COMBINED TYPE: ICD-10-CM

## 2024-01-09 DIAGNOSIS — R93.1 ELEVATED CORONARY ARTERY CALCIUM SCORE: ICD-10-CM

## 2024-01-09 DIAGNOSIS — Z00.00 ROUTINE GENERAL MEDICAL EXAMINATION AT A HEALTH CARE FACILITY: Primary | ICD-10-CM

## 2024-01-09 DIAGNOSIS — G57.11 MERALGIA PARESTHETICA OF RIGHT SIDE: ICD-10-CM

## 2024-01-09 DIAGNOSIS — G25.81 RESTLESS LEGS SYNDROME (RLS): ICD-10-CM

## 2024-01-09 PROCEDURE — 99214 OFFICE O/P EST MOD 30 MIN: CPT | Mod: 25 | Performed by: INTERNAL MEDICINE

## 2024-01-09 PROCEDURE — 99396 PREV VISIT EST AGE 40-64: CPT | Mod: 25 | Performed by: INTERNAL MEDICINE

## 2024-01-09 PROCEDURE — 90686 IIV4 VACC NO PRSV 0.5 ML IM: CPT | Performed by: INTERNAL MEDICINE

## 2024-01-09 PROCEDURE — 90471 IMMUNIZATION ADMIN: CPT | Performed by: INTERNAL MEDICINE

## 2024-01-09 RX ORDER — ATOMOXETINE 40 MG/1
40 CAPSULE ORAL DAILY
Qty: 30 CAPSULE | Refills: 11 | Status: SHIPPED | OUTPATIENT
Start: 2024-01-09 | End: 2024-08-08

## 2024-01-09 RX ORDER — GABAPENTIN 100 MG/1
400 CAPSULE ORAL AT BEDTIME
Qty: 360 CAPSULE | Refills: 3 | Status: SHIPPED | OUTPATIENT
Start: 2024-01-09

## 2024-01-09 ASSESSMENT — ENCOUNTER SYMPTOMS
FEVER: 0
JOINT SWELLING: 0
CHILLS: 0
COUGH: 0
HEMATOCHEZIA: 0
HEARTBURN: 0
NAUSEA: 0
CONSTIPATION: 0
DIARRHEA: 0
DIZZINESS: 0
MYALGIAS: 0
SORE THROAT: 0
HEMATURIA: 0
ARTHRALGIAS: 0
HEADACHES: 0
ABDOMINAL PAIN: 0
FREQUENCY: 0
PALPITATIONS: 0
NERVOUS/ANXIOUS: 0
SHORTNESS OF BREATH: 0
WEAKNESS: 0
PARESTHESIAS: 0
DYSURIA: 0

## 2024-01-09 NOTE — PROGRESS NOTES
SUBJECTIVE:   Max is a 58 year old, presenting for the followin-year-old male here for his annual physical and follow-up medical problems including ADHD, meralgia paresthetica and elevated coronary calcium score.  See assessment and plan for details.  No chief complaint on file.        2024    12:52 PM   Additional Questions   Roomed by Patria KELLEY       Healthy Habits:     Getting at least 3 servings of Calcium per day:  Yes    Bi-annual eye exam:  Yes    Dental care twice a year:  Yes    Sleep apnea or symptoms of sleep apnea:  None    Diet:  Low fat/cholesterol    Frequency of exercise:  6-7 days/week    Duration of exercise:  Greater than 60 minutes    Taking medications regularly:  Yes    Additional concerns today:  Yes                      Social History     Tobacco Use    Smoking status: Never     Passive exposure: Never    Smokeless tobacco: Never   Substance Use Topics    Alcohol use: Yes     Comment: 1-2/ night             2024    12:40 PM   Alcohol Use   Prescreen: >3 drinks/day or >7 drinks/week? No       Last PSA:   Prostate Specific Antigen Screen   Date Value Ref Range Status   2023 0.69 0.00 - 3.50 ng/mL Final   10/01/2021 0.61 0.00 - 3.50 ug/L Final       Reviewed orders with patient. Reviewed health maintenance and updated orders accordingly - Yes  Labs reviewed in EPIC    Reviewed and updated as needed this visit by clinical staff   Tobacco  Allergies  Meds              Reviewed and updated as needed this visit by Provider                 Past Medical History:   Diagnosis Date    Abdominal pain, left upper quadrant     EGD normal      Attention deficit hyperactivity disorder (ADHD), combined type 2023    Bat bite wound 2020    Possible parathyroid.  Underwent rabies vaccination    Cervical disc herniation     Chronic LBP     Chronic left shoulder pain     labral tear    Chronic low back pain 08/10/2020    Formatting of this note might be different from the  original. Replacement Utility updated for latest IMO load Formatting of this note might be different from the original. Replacement Utility updated for latest IMO load    Chronic maxillary sinusitis     ENT evaluation February 2015    Chronic sore throat 09/28/2020    Complete tear, knee, anterior cruciate ligament 10/01/2021    Eczema     Elevated coronary artery calcium score 02/10/2023    CT coronary calcium score 130 placing him in the 79th percentile compared to men his age.  Recommending statin and aspirin    Facial trauma 01/01/2015    Globus sensation 01/01/2014    normal ENT evaluation    Influenza A 01/01/2014    Meralgia paresthetica of right side 09/28/2020    Recurrent pneumonia     IgG level normal 2015    Restless legs syndrome (RLS) 10/01/2021    Right hamstring injury 05/26/2016    Rupture of anterior cruciate ligament of right knee 04/29/2021    surgery 2021    Tinnitus 01/01/2012    ENT evaluation      Past Surgical History:   Procedure Laterality Date    ARTHROSCOPIC REPAIR ACL Right     2021    COLONOSCOPY      Normal May 2015    SKIN SURGERY      Birthmark removal    WISDOM TOOTH EXTRACTION         Review of Systems   Constitutional:  Negative for chills and fever.   HENT:  Negative for congestion, ear pain, hearing loss and sore throat.    Eyes:  Negative for visual disturbance.   Respiratory:  Negative for cough and shortness of breath.    Cardiovascular:  Negative for chest pain, palpitations and peripheral edema.   Gastrointestinal:  Negative for abdominal pain, constipation, diarrhea, heartburn, hematochezia and nausea.   Genitourinary:  Negative for dysuria, frequency, genital sores, hematuria, impotence, penile discharge and urgency.   Musculoskeletal:  Negative for arthralgias, joint swelling and myalgias.   Skin:  Negative for rash.   Neurological:  Negative for dizziness, weakness, headaches and paresthesias.   Psychiatric/Behavioral:  Negative for mood changes. The patient is not  "nervous/anxious.          OBJECTIVE:   /70 (BP Location: Right arm, Patient Position: Sitting, Cuff Size: Adult Regular)   Pulse 68   Temp 98.2  F (36.8  C) (Oral)   Resp 16   Ht 1.778 m (5' 10\")   Wt 74.8 kg (165 lb)   SpO2 98%   BMI 23.68 kg/m      Physical Exam  EYES: Eyelids, conjunctiva, and sclera were normal. Pupils were normal. Cornea, iris, and lens were normal bilaterally.  HEAD, EARS, NOSE, MOUTH, AND THROAT: Head and face were normal. TMs and external auditory canals are normal.  Oropharynx normal  NECK: Neck appearance was normal. There were no neck masses and the thyroid was not enlarged and no nodules are felt.  No lymphadenopathy.  RESPIRATORY: Breathing pattern was normal and the chest moved symmetrically.   Lung sounds were normal and there were no rales or wheezes.  CARDIOVASCULAR: Heart rate and rhythm were normal.  S1 and S2 were normal and there were no extra sounds or murmurs. Peripheral pulses in arms and legs were normal.  There was no peripheral edema.  No carotid bruits.  GASTROINTESTINAL:  Bowel sounds were present.   Palpation detected no tenderness, mass, or enlarged organs.   RECTAL/PROSTATE: Deferred  MUSCULOSKELETAL: Skeletal configuration was normal and muscle mass was normal for age. Joint appearance was overall normal.  LYMPHATIC: There were no enlarged nodes.  SKIN/HAIR/NAILS: Skin color was normal.  There were no concerning skin lesions.  NEUROLOGIC: The patient was alert and oriented to person, place, time, and circumstance. Speech was normal. Cranial nerves were normal. Motor strength was normal for age. The patient was normally coordinated.  Sensation intact.  PSYCHIATRIC:  Mood and affect were normal and the patient had normal recent and remote memory. The patient's judgment and insight were normal.         ASSESSMENT/PLAN:   1. Routine general medical examination at a health care facility  Immunizations are reviewed and providing flu shot.  Discussed " COVID-vaccine.  Non-smoker.  Uses alcohol in moderation.  Regular exercise and healthy diet habits to maintain a healthy weight discussed.  Up to date with colonoscopies and this should be repeated in 2025.  Prostate exam is deferred but his PSA will continue to be checked annually for prostate cancer screening.   He sees his ophthalmologist every year.  Regular dental visits every 6 months discussed.  Skin exam performed and recommending regular use of sunblock.  He sees a dermatologist every 6 months.  Hepatitis C antibody for screening was normal.  Screened for diabetes with fasting glucose.      2. Attention deficit hyperactivity disorder (ADHD), combined type  Adderall 10 mg has been helpful but it is causing him some trouble with insomnia.  Switching to Strattera might be a better approach and will start with 40 mg daily increasing to 80 mg daily after 1 week if necessary.  - atomoxetine (STRATTERA) 40 MG capsule; Take 1 capsule (40 mg) by mouth daily  Dispense: 30 capsule; Refill: 11    3. Meralgia paresthetica of right side  Continued pain, burning and itching in his right anterior thigh attributed to meralgia paresthetica.  Alternatively related to lumbar radiculopathy.  I would like him to be assessed by our spine clinic to see if there are any other treatment options besides the gabapentin that he is currently taking.  We clarified his dose of gabapentin and he is trying to minimize use and we will get him some 100 mg capsules so that he can take anywhere from 1-4 daily  - Spine  Referral; Future    4. Elevated coronary artery calcium score  Elevated coronary calcium score.  Now on rosuvastatin and tolerating current dose.  Reviewed his recent lipid profile results indicating good control with LDL under 70    5. Restless legs syndrome (RLS)  Stable  - gabapentin (NEURONTIN) 100 MG capsule; Take 4 capsules (400 mg) by mouth at bedtime  Dispense: 360 capsule; Refill: 3     Patient has been advised  of split billing requirements and indicates understanding: Yes          He reports that he has never smoked. He has never been exposed to tobacco smoke. He has never used smokeless tobacco.            Ash Guardado MD  [facility]

## 2024-01-09 NOTE — PATIENT INSTRUCTIONS
Begin with 40 mg of Strattera daily.  After 1 week you can increase the dose to 80 mg daily if you are not finding the starting dose as effective as Adderall     Preventive Health Recommendations  Male Ages 50 - 64    Yearly exam:             See your health care provider every year in order to  o   Review health changes.   o   Discuss preventive care.    o   Review your medicines if your doctor has prescribed any.   Cholesterol annually  Diabetes screening  Colonoscopy will be due in 2025  PSA annually for prostate cancer screening  You should be tested each year for STDs (sexually transmitted diseases), if you re at risk.     Shots: Get a flu shot each year. Get a tetanus shot every 10 years.  Consider COVID-vaccine    Nutrition:  Eat at least 5 servings of fruits and vegetables daily.   Eat whole-grain bread, whole-wheat pasta and brown rice instead of white grains and rice.   Get adequate Calcium and Vitamin D.     Lifestyle  Exercise for at least 150 minutes a week (30 minutes a day, 5 days a week). This will help you control your weight and prevent disease.   Limit alcohol to one drink per day.   No smoking.   Wear sunscreen to prevent skin cancer.  Dr. Do or Dr. Ramsey are both excellent  See your dentist every six months for an exam and cleaning.   See your eye doctor every 1 to 2 years.

## 2024-01-31 DIAGNOSIS — R93.1 ELEVATED CORONARY ARTERY CALCIUM SCORE: ICD-10-CM

## 2024-01-31 RX ORDER — ROSUVASTATIN CALCIUM 20 MG/1
20 TABLET, COATED ORAL DAILY
Qty: 90 TABLET | Refills: 2 | Status: SHIPPED | OUTPATIENT
Start: 2024-01-31

## 2024-02-02 ENCOUNTER — MYC MEDICAL ADVICE (OUTPATIENT)
Dept: INTERNAL MEDICINE | Facility: CLINIC | Age: 59
End: 2024-02-02
Payer: COMMERCIAL

## 2024-02-02 DIAGNOSIS — F90.2 ATTENTION DEFICIT HYPERACTIVITY DISORDER (ADHD), COMBINED TYPE: ICD-10-CM

## 2024-02-02 RX ORDER — DEXTROAMPHETAMINE SACCHARATE, AMPHETAMINE ASPARTATE, DEXTROAMPHETAMINE SULFATE AND AMPHETAMINE SULFATE 2.5; 2.5; 2.5; 2.5 MG/1; MG/1; MG/1; MG/1
10 TABLET ORAL DAILY
Qty: 30 TABLET | Refills: 0 | Status: SHIPPED | OUTPATIENT
Start: 2024-02-02 | End: 2024-03-16

## 2024-03-16 ENCOUNTER — MYC REFILL (OUTPATIENT)
Dept: INTERNAL MEDICINE | Facility: CLINIC | Age: 59
End: 2024-03-16
Payer: COMMERCIAL

## 2024-03-16 DIAGNOSIS — F90.2 ATTENTION DEFICIT HYPERACTIVITY DISORDER (ADHD), COMBINED TYPE: ICD-10-CM

## 2024-03-18 RX ORDER — DEXTROAMPHETAMINE SACCHARATE, AMPHETAMINE ASPARTATE, DEXTROAMPHETAMINE SULFATE AND AMPHETAMINE SULFATE 2.5; 2.5; 2.5; 2.5 MG/1; MG/1; MG/1; MG/1
10 TABLET ORAL DAILY
Qty: 30 TABLET | Refills: 0 | Status: SHIPPED | OUTPATIENT
Start: 2024-03-18 | End: 2024-06-21

## 2024-04-16 ENCOUNTER — PRE VISIT (OUTPATIENT)
Dept: NEUROSURGERY | Facility: CLINIC | Age: 59
End: 2024-04-16

## 2024-04-16 ENCOUNTER — OFFICE VISIT (OUTPATIENT)
Dept: NEUROSURGERY | Facility: CLINIC | Age: 59
End: 2024-04-16
Attending: INTERNAL MEDICINE
Payer: COMMERCIAL

## 2024-04-16 VITALS — HEART RATE: 54 BPM | SYSTOLIC BLOOD PRESSURE: 117 MMHG | OXYGEN SATURATION: 100 % | DIASTOLIC BLOOD PRESSURE: 75 MMHG

## 2024-04-16 DIAGNOSIS — G57.11 MERALGIA PARESTHETICA OF RIGHT SIDE: ICD-10-CM

## 2024-04-16 DIAGNOSIS — R20.0 NUMBNESS OF RIGHT ANTERIOR THIGH: Primary | ICD-10-CM

## 2024-04-16 PROCEDURE — 99203 OFFICE O/P NEW LOW 30 MIN: CPT | Performed by: PHYSICIAN ASSISTANT

## 2024-04-16 NOTE — NURSING NOTE
"Murray Piper is a 59 year old male who presents for:  Chief Complaint   Patient presents with    Consult     Numbness and tingling down right upper thigh. No pain, but itching.         Initial Vitals:  /75   Pulse 54   SpO2 100%  Estimated body mass index is 23.68 kg/m  as calculated from the following:    Height as of 1/9/24: 5' 10\" (1.778 m).    Weight as of 1/9/24: 165 lb (74.8 kg).. There is no height or weight on file to calculate BSA. BP completed using cuff size: regular  Data Unavailable      Alesha Liu    "

## 2024-04-16 NOTE — PROGRESS NOTES
Neurosurgery Consult    HPI    Mr. Piper is a 59-year-old male presents to clinic for evaluation of burning numbness and tingling and occasionally pain in his right lateral thigh.  Is been going on for several years.  He had a lumbar MRI in 2019 which did not demonstrate any foraminal stenosis at that time and he did have similar symptoms at that time.  He reports it is improved with aggressive massage to his IT band.  He states gabapentin at 900 mg daily helps him, but he has had side effects from this so he sent to cut back.  He denies weakness, bowel or bladder symptoms or saddle anesthesia.  Reports some right-sided low back pain as well.  Denies right-sided hip pain.  He has tried physical therapy, without much improvement.  He has not had an EMG.    Medical history  Noncontributory    Social history  , skis, very active with lots of walking each day      B/P: 117/75, T: Data Unavailable, P: 54, R: Data Unavailable       Exam    Alert and oriented no acute distress  Bilateral lower extremities with 5/5 strength  Reflexes 2+ patella/ankle  Negative straight leg raise bilaterally  Negative ankle clonus negative Babinski bilaterally  Lumbar spine nontender to palpation  Able to stand on heels and toes  Gait is normal    Sensation slightly altered in the right lateral thigh compared to the left, somewhat diminished sensation on the right    Imaging    Lumbar MRI from 2019 was without foraminal stenosis or high-grade central stenosis, small disc bulge at L5-S1.    Assessment    Right thigh paresthesias and pain    Plan:      I recommend the patient obtain an EMG of the right lower extremity, and will follow-up with the results when they are available.    Addendum 5/8/2024    EMG demonstrated right meralgia paresthetica, will refer patient to the pain clinic for further management.

## 2024-04-16 NOTE — LETTER
4/16/2024         RE: Murray Piper  [address] [place] [address]        Dear Colleague,    Thank you for referring your patient, Murray Piper, to the [facility] NEUROLOG[facility]. Please see a copy of my visit note below.    Neurosurgery Consult    HPI    Mr. Piper is a 59-year-old male presents to clinic for evaluation of burning numbness and tingling and occasionally pain in his right lateral thigh.  Is been going on for several years.  He had a lumbar MRI in 2019 which did not demonstrate any foraminal stenosis at that time and he did have similar symptoms at that time.  He reports it is improved with aggressive massage to his IT band.  He states gabapentin at 900 mg daily helps him, but he has had side effects from this so he sent to cut back.  He denies weakness, bowel or bladder symptoms or saddle anesthesia.  Reports some right-sided low back pain as well.  Denies right-sided hip pain.  He has tried physical therapy, without much improvement.  He has not had an EMG.    Medical history  Noncontributory    Social history  , skis, very active with lots of walking each day      B/P: 117/75, T: Data Unavailable, P: 54, R: Data Unavailable       Exam    Alert and oriented no acute distress  Bilateral lower extremities with 5/5 strength  Reflexes 2+ patella/ankle  Negative straight leg raise bilaterally  Negative ankle clonus negative Babinski bilaterally  Lumbar spine nontender to palpation  Able to stand on heels and toes  Gait is normal    Sensation slightly altered in the right lateral thigh compared to the left, somewhat diminished sensation on the right    Imaging    Lumbar MRI from 2019 was without foraminal stenosis or high-grade central stenosis, small disc bulge at L5-S1.    Assessment    Right thigh paresthesias and pain    Plan:      I recommend the patient obtain an EMG of the right lower extremity, and will follow-up with the results when they are  available.      Again, thank you for allowing me to participate in the care of your patient.        Sincerely,        Sandy Echeverria PA-C

## 2024-04-16 NOTE — TELEPHONE ENCOUNTER
SPINE PATIENTS - NEW PROTOCOL PREVISIT    RECORDS RECEIVED FROM: Referred by Ash Guardado MD   REASON FOR VISIT: pinched nerve with pain radiating down (R) leg   PROVIDER: baldo   DATE OF APPT: 04/16/2024   NOTES (FOR ALL VISITS) STATUS DETAILS   OFFICE NOTE from referring provider Internal Referral and notes in chart   OFFICE NOTE from other specialist Internal PT last completed 09/25/2019   DISCHARGE SUMMARY from hospital N/A    DISCHARGE REPORT from ER N/A    OPERATIVE REPORT N/A    EMG REPORT N/A    MEDICATION LIST N/A    IMAGING  (FOR ALL VISITS)     MRI (HEAD, NECK, SPINE) Internal no recent imaging, MR lumbat completed 06/28/2019   XRAY (SPINE) *NEUROSURGERY* N/A    CT (HEAD, NECK, SPINE) N/A

## 2024-05-07 ENCOUNTER — OFFICE VISIT (OUTPATIENT)
Dept: NEUROLOGY | Facility: CLINIC | Age: 59
End: 2024-05-07
Attending: PHYSICIAN ASSISTANT
Payer: COMMERCIAL

## 2024-05-07 DIAGNOSIS — G57.11 MERALGIA PARAESTHETICA, RIGHT: Primary | ICD-10-CM

## 2024-05-07 DIAGNOSIS — R20.0 NUMBNESS OF RIGHT ANTERIOR THIGH: ICD-10-CM

## 2024-05-07 PROCEDURE — 95885 MUSC TST DONE W/NERV TST LIM: CPT | Performed by: PSYCHIATRY & NEUROLOGY

## 2024-05-07 PROCEDURE — 95907 NVR CNDJ TST 1-2 STUDIES: CPT | Performed by: PSYCHIATRY & NEUROLOGY

## 2024-05-07 NOTE — PROGRESS NOTES
[facility]  Electrodiagnostic Laboratory                 Department of Neurology                                                                                                         Test Date:  2024    Patient: Max Piper : 1965 Physician: Ronnie Marley MD   Sex: Male AGE: 59 year Ref Phys: Sandy Echeverria PA-C   ID#: [id number]   Technician: Charlette Hdz     History and Examination:    59-year-old man with chief complaint of chronic numbness and tingling at the right lateral/outer thigh, extending to a couple of inches above the knee.  The paresthesias do not radiate to the lower leg or foot.  EMG was requested to evaluate for right lateral femoral cutaneous neuropathy (meralgia paresthetica) versus upper lumbar radiculopathy at L2-L3.    Techniques:    Sensory nerve conduction studies were done with surface recording electrodes. EMG was done with a concentric needle electrode.     Results:    The left lateral femoral cutaneous antidromic sensory nerve conduction study showed a response with an amplitude of 3  V, and normal conduction velocity.  The right lateral femoral cutaneous antidromic sensory nerve conduction study showed no response.  Needle EMG of the right vastus lateralis, adductor longus, and iliopsoas, was normal.    Interpretation:    Abnormal study.  There is electrodiagnostic evidence of a right lateral femoral cutaneous neuropathy, as seen with meralgia paresthetica.  There is no electrodiagnostic evidence of right L2-L3-L4 radiculopathy.      ___________________________  Ronnie Marley MD        Nerve Conduction Studies  Sensory Sites      Onset Lat Peak Lat Amp (O-P) Amp (P-P) Segment Distance Velocity Temperature Comment   Site ms (ms)  V Norm ( V)  cm m/s Norm ( C)    Left Lateral Femoral Cutaneous Sensory   ASIS-Lateral Thigh 3.4 4.5 3 - - ASIS-Lateral Thigh 24 71 - 24.5    Right Lateral Femoral Cutaneous Sensory    ASIS-Lateral Thigh NR NR NR - NR ASIS-Lateral Thigh - NR - 24.6        Electromyography     Side Muscle Ins Act Fibs/PSW Fasc HF Amp Dur Poly Recrt Int Pat   Right Vastus lat Nml None Nml 0 Nml Nml 0 Nml Nml   Right Add longus Nml None Nml 0 Nml Nml 0 Nml Nml   Right Iliopsoas Nml None Nml 0 Nml Nml 0 Nml Nml         NCS Waveforms:    Sensory           Ultrasound Images:

## 2024-05-07 NOTE — LETTER
2024         RE: Murray Piper  [address] [place] [address]        Dear Colleague,    Thank you for referring your patient, Murray Piper, to the [facility] NEUROLOG[facility]ICS [facility]. Please see a copy of my visit note below.                        [facility]  Electrodiagnostic Laboratory                 Department of Neurology                                                                                                         Test Date:  2024    Patient: Max Piper : 1965 Physician: Ronnie Marley MD   Sex: Male AGE: 59 year Ref Phys: Sandy Echeverria PA-C   ID#: [id number]   Technician: Charlette Hdz     History and Examination:    59-year-old man with chief complaint of chronic numbness and tingling at the right lateral/outer thigh, extending to a couple of inches above the knee.  The paresthesias do not radiate to the lower leg or foot.  EMG was requested to evaluate for right lateral femoral cutaneous neuropathy (meralgia paresthetica) versus upper lumbar radiculopathy at L2-L3.    Techniques:    Sensory nerve conduction studies were done with surface recording electrodes. EMG was done with a concentric needle electrode.     Results:    The left lateral femoral cutaneous antidromic sensory nerve conduction study showed a response with an amplitude of 3  V, and normal conduction velocity.  The right lateral femoral cutaneous antidromic sensory nerve conduction study showed no response.  Needle EMG of the right vastus lateralis, adductor longus, and iliopsoas, was normal.    Interpretation:    Abnormal study.  There is electrodiagnostic evidence of a right lateral femoral cutaneous neuropathy, as seen with meralgia paresthetica.  There is no electrodiagnostic evidence of right L2-L3-L4 radiculopathy.      ___________________________  Ronnie Marley MD        Nerve Conduction Studies  Sensory Sites      Onset Lat Peak Lat Amp  (O-P) Amp (P-P) Segment Distance Velocity Temperature Comment   Site ms (ms)  V Norm ( V)  cm m/s Norm ( C)    Left Lateral Femoral Cutaneous Sensory   ASIS-Lateral Thigh 3.4 4.5 3 - - ASIS-Lateral Thigh 24 71 - 24.5    Right Lateral Femoral Cutaneous Sensory   ASIS-Lateral Thigh NR NR NR - NR ASIS-Lateral Thigh - NR - 24.6        Electromyography     Side Muscle Ins Act Fibs/PSW Fasc HF Amp Dur Poly Recrt Int Pat   Right Vastus lat Nml None Nml 0 Nml Nml 0 Nml Nml   Right Add longus Nml None Nml 0 Nml Nml 0 Nml Nml   Right Iliopsoas Nml None Nml 0 Nml Nml 0 Nml Nml         NCS Waveforms:    Sensory           Ultrasound Images:            Again, thank you for allowing me to participate in the care of your patient.        Sincerely,        Ronnie Marley MD

## 2024-06-21 ENCOUNTER — MYC REFILL (OUTPATIENT)
Dept: INTERNAL MEDICINE | Facility: CLINIC | Age: 59
End: 2024-06-21
Payer: COMMERCIAL

## 2024-06-21 DIAGNOSIS — F90.2 ATTENTION DEFICIT HYPERACTIVITY DISORDER (ADHD), COMBINED TYPE: ICD-10-CM

## 2024-06-21 RX ORDER — DEXTROAMPHETAMINE SACCHARATE, AMPHETAMINE ASPARTATE, DEXTROAMPHETAMINE SULFATE AND AMPHETAMINE SULFATE 2.5; 2.5; 2.5; 2.5 MG/1; MG/1; MG/1; MG/1
10 TABLET ORAL DAILY
Qty: 30 TABLET | Refills: 0 | Status: SHIPPED | OUTPATIENT
Start: 2024-06-21 | End: 2024-08-24

## 2024-07-15 ENCOUNTER — TELEPHONE (OUTPATIENT)
Dept: NEUROSURGERY | Facility: CLINIC | Age: 59
End: 2024-07-15
Payer: COMMERCIAL

## 2024-07-15 NOTE — TELEPHONE ENCOUNTER
Per 5/8 addendum note from Sandy Echeverria PA-C:    EMG demonstrated right meralgia paresthetica, will refer patient to the pain clinic for further management.     Referral was also placed, it does not appear appointment has been scheduled/completed.     Updated patient via [employer].

## 2024-07-15 NOTE — TELEPHONE ENCOUNTER
[facility] Call Center    Phone Message    May a detailed message be left on voicemail: yes     Reason for Call: Other: Pt called asking about EMG results, please call back to advise      Action Taken: Message routed to:  Other: [facility]    Travel Screening: Not Applicable     Date of Service:

## 2024-08-07 ASSESSMENT — ANXIETY QUESTIONNAIRES
2. NOT BEING ABLE TO STOP OR CONTROL WORRYING: NOT AT ALL
GAD7 TOTAL SCORE: 0
7. FEELING AFRAID AS IF SOMETHING AWFUL MIGHT HAPPEN: NOT AT ALL
GAD7 TOTAL SCORE: 0
6. BECOMING EASILY ANNOYED OR IRRITABLE: NOT AT ALL
4. TROUBLE RELAXING: NOT AT ALL
GAD7 TOTAL SCORE: 0
7. FEELING AFRAID AS IF SOMETHING AWFUL MIGHT HAPPEN: NOT AT ALL
5. BEING SO RESTLESS THAT IT IS HARD TO SIT STILL: NOT AT ALL
3. WORRYING TOO MUCH ABOUT DIFFERENT THINGS: NOT AT ALL
1. FEELING NERVOUS, ANXIOUS, OR ON EDGE: NOT AT ALL

## 2024-08-07 ASSESSMENT — PAIN SCALES - PAIN ENJOYMENT GENERAL ACTIVITY SCALE (PEG)
AVG_PAIN_PASTWEEK: 0
PEG_TOTALSCORE: 0
INTERFERED_ENJOYMENT_LIFE: 0
INTERFERED_GENERAL_ACTIVITY: 0 - DOES NOT INTERFERE
AVG_PAIN_PASTWEEK: 0 - NO PAIN
INTERFERED_GENERAL_ACTIVITY: 0
PEG_TOTALSCORE: 0
INTERFERED_ENJOYMENT_LIFE: 0 - DOES NOT INTERFERE

## 2024-08-07 NOTE — PROGRESS NOTES
Date:8/8/2024      COMPREHENSIVE PAIN CLINIC INITIAL EVALUATION  I had the pleasure of meeting Mr. Murray Piper on 8/8/2024 in the Chronic Pain Clinic in consult for Dr. Echeverria with regards to his pain.  The patient is a 59 year old male with past medical history of R) meralgia paresthetica, ADHD, RLS who presents for evaluation of chronic pain.      History of of chronic pain on initial exam 8/8/2024                               Subjective:  Patient endorses chronic symptoms R) lateral thigh that started about 4 years ago which started out as itching and has progressed to numbness.  He was also told he has very tight IT band on the R) thigh. He has done graston massage therapy on his own which relieves the pain temporarily.  Symptoms are progressively becoming worse.He does not have pain in there R) thigh/leg.   Patient has itching, numbness and tingling in R) lateral thigh down to the knee.  He also has right low back pain.  Patient has not had any spine or hip surgery in the past.  The patient describes the symptoms  as tightness, numbness,.  He reports that the pain is made worse by lunges, extensive walking, exercise.  His pain is improved with aggressive massage to his IT band and gabapentin.  Gabapentin 900mg daily caused side effects/grogginess so he had to reduce the dose.  He has not had formal PT for meralgia paresthetica.     Patient endorses anxiety and depression.  Patient does not follow with a mental health care provider.  Patient exercises by walking 15,000 steps daily and piliates.      Progress Notes Reviewed:  05/07/2024 Dr. Ronnie Marley, Neurology - EMG  04/16/2024 RADAH Tamayo-C - EMG demonstrated R) meralgia paresthetica  01/09/2024 Dr. Ash Guardado, Internal Medicine - annual physical      He denies any new problems with falls or balance, any new numbness or weakness of the arms or legs, any new bowel or bladder incontinence, any night sweats or unexplained fevers,  or any sudden or unexpected weight loss.  He denies saddle anesthesia. He denies changes in gait, instability, or falling episodes.     Murray Piper has not been seen at a pain clinic in the past.        Current Treatments:  Adderall 10mg 1 tab three times a week because it impacts sleep  Gabapentin 100mg 5 capsules q hs  Ibuprofen 2 tabs q hs  Magnesium glycinate 400mg   Cold baths at the gym is helpful    Anticoagulation:  none      Previous Medication Treatments Included:  Anti-convulsants: never tried lyrica  Muscle relaxors:  no  Anti-depressants: never tried cymbalta  Benzodiazapine's: no  Acetaminophen/NSAIDs:   Topicals: no  Opioids: no      Other Treatments Have Included:  Physical therapy: no  Pain Psychology: no  Chiropractic: no  Acupuncture: no  TENs Unit: no  Injections: no  Surgeries: R) knee ACL repair  Dry Needling: no  Massage:no    Implantable devices:  none      Past Medical History:  Medical history reviewed.  Past Medical History:   Diagnosis Date    Abdominal pain, left upper quadrant     EGD normal  2012    Attention deficit hyperactivity disorder (ADHD), combined type 01/06/2023    Bat bite wound 01/01/2020    Possible parathyroid.  Underwent rabies vaccination    Cervical disc herniation     Chronic LBP     Chronic left shoulder pain     labral tear    Chronic low back pain 08/10/2020    Formatting of this note might be different from the original. Replacement Utility updated for latest IMO load Formatting of this note might be different from the original. Replacement Utility updated for latest IMO load    Chronic maxillary sinusitis     ENT evaluation February 2015    Chronic sore throat 09/28/2020    Complete tear, knee, anterior cruciate ligament 10/01/2021    Eczema     Elevated coronary artery calcium score 02/10/2023    CT coronary calcium score 130 placing him in the 79th percentile compared to men his age.  Recommending statin and aspirin    Facial trauma 01/01/2015    Darrion  sensation 01/01/2014    normal ENT evaluation    Influenza A 01/01/2014    Meralgia paresthetica of right side 09/28/2020    Recurrent pneumonia     IgG level normal 2015    Restless legs syndrome (RLS) 10/01/2021    Right hamstring injury 05/26/2016    Rupture of anterior cruciate ligament of right knee 04/29/2021    surgery 2021    Tinnitus 01/01/2012    ENT evaluation      Patient Active Problem List   Diagnosis    Meralgia paresthetica of right side    Restless legs syndrome (RLS)    Attention deficit hyperactivity disorder (ADHD), combined type    Elevated coronary artery calcium score         Past Surgical History:  Pertinent surgical history reviewed.  Past Surgical History:   Procedure Laterality Date    ARTHROSCOPIC REPAIR ACL Right     2021    COLONOSCOPY      Normal May 2015    SKIN SURGERY      Birthmark removal    WISDOM TOOTH EXTRACTION            Medications: Pertinent medications reviewed.  Current Outpatient Medications   Medication Sig Dispense Refill    amphetamine-dextroamphetamine (ADDERALL) 10 MG tablet Take 1 tablet (10 mg) by mouth daily 30 tablet 0    aspirin (ASA) 81 MG EC tablet Take 1 tablet (81 mg) by mouth daily      atomoxetine (STRATTERA) 40 MG capsule Take 1 capsule (40 mg) by mouth daily 30 capsule 11    cetirizine (ZYRTEC) 10 MG tablet Take 1 tablet (10 mg) by mouth daily for 14 days      ciclopirox (PENLAC) 8 % external solution       fluticasone (FLONASE) 50 MCG/ACT nasal spray Spray 2 sprays into both nostrils daily 16 g 11    gabapentin (NEURONTIN) 100 MG capsule Take 4 capsules (400 mg) by mouth at bedtime 360 capsule 3    ibuprofen (MOTRIN IB) 200 MG tablet [IBUPROFEN (MOTRIN IB) 200 MG TABLET] Take 1-2 tablets (200-400 mg total) by mouth every 8 (eight) hours as needed for pain. 100 tablet 2    ketoconazole (NIZORAL) 2 % external cream APPLY TWICE DAILY TO TOE WEBS AND NAILS      rosuvastatin (CRESTOR) 20 MG tablet TAKE 1 TABLET(20 MG) BY MOUTH DAILY 90 tablet 2       [place]  Prescription Monitoring Program reviewed 8/7/2024.  No concern for abuse or misuse of controlled medications based on this report.    07/29/2024 Gabapentin 100mg 360 tabs for 90 days  06/21/20224 Adderall 10mg 30 tabs for 30 days  03/18/2024 Gabapentin 100mg 360 tabs for 90 days  Regular scripts for above medications in 2024.      Allergies: Pertinent allergies reviewed.   No Known Allergies    Family History:   family history includes Breast Cancer in his mother; Dementia in his father; Heart Disease in his maternal grandfather and paternal grandfather; Hyperlipidemia in his mother; No Known Problems in his brother, brother, and sister; Parkinsonism in his father; Skin Cancer in his mother.    Social History:   He is  and lives in a house in [place] and has 3 boys.  He is self employed as a .  He enjoys exercise, working out at [employer].  He  reports that he has never smoked. He has never been exposed to tobacco smoke. He has never used smokeless tobacco. He reports current alcohol use. He reports that he does not use drugs.  Social History     Social History Narrative    [employer]  , 3 children 17 twins and 13         Review of Systems:      (Positive responses bolded)  GENERAL: fever/chills, fatigue, general unwell feeling, weight gain/loss  HEAD/EYES:  headache, dizziness, or vision changes  EARS/NOSE/THROAT: nosebleeds, hearing loss, sinus infection, earache, tinnitus  IMMUNE:  allergies, cancer, immune deficiency, or infections  SKIN:  itching, rash, hives  HEME/Lymphatic: anemia, easy bruising, easy bleeding  RESPIRATORY: cough, wheezing, or shortness of breath  CARDIOVASCULAR/Circulation: extremity edema, syncope, hypertension, tachycardia, or angina  GASTROINTESTINAL: abdominal pain, nausea/emesis, diarrhea, constipation, hematochezia, or melena  ENDOCRINE:  diabetes, steroid use, thyroid disease or osteoporosis  MUSCULOSKELETAL: myalgias, joint pain, stiffness, neck  pain, back pain, arthritis, or gout  GENITOURINARY: frequency, urgency, dysuria, difficulty voiding, hematuria or incontinence  NEUROLOGIC: weakness, numbness, paresthesias, seizure, tremor, stroke or memory loss  PSYCHIATRIC: depression, anxiety, stress, suicidal thoughts/attempts or mood swings      Physical Exam:  /73   Pulse 79   Wt 76.2 kg (168 lb 1.6 oz)   SpO2 98%   BMI 24.12 kg/m        Constitutional: He is oriented to person, place, and time.  He appears well-developed and well-nourished. He is not in acute distress.   HENT:     Head: Normocephalic and atraumatic.     Eyes: Pupils are equal, round, and reactive to light. EOM are normal. No scleral icterus.   Pulmonary/Chest:  NWOB. No respiratory distress.   Neurological: He is alert and oriented to person, place, and time. Coordination grossly normal.  Romberg test negative. Manley's test is negative  Skin: Skin is warm and dry. He is not diaphoretic.   Psychiatric: He has a normal mood and affect. His behavior is normal. Judgment and thought content normal.  Patient answers questions appropriately.  MSK: Gait is normal.  Patient can walk on toes, heals, heal toe walk and perform heal to shin testing without difficulty.    Lumbar/Thoracic spine:   ROM: flexion 60 degrees, extension 20 degrees, left lateral 20 degrees, and right lateral 20 degrees  Rotation/ext to right: pain free  Rotation/ext to left: pain freeFocal tenderness: No SI joint, gluteal, piriformis, or GT tenderness  Normal 5/5 LE strength bilaterally   Normal sensation to light touch on right LE.  Decreased sensation to L) LE lateral thigh and calf.  No allodynia, dysesthesia, or hyperalgesia in the lower extremities bilaterally   Reflexes: Lower extremity reflexes within normal limits bilaterally  Straight leg raise: Negative on the left  Negative on the right    Imaging:  EXAM: MR LUMBAR SPINE WO CONTRAST  LOCATION: [facility]  DATE/TIME: 6/28/2019 6:53 AM      INDICATION: Worsening low back pain.  COMPARISON: None.  TECHNIQUE: Without IV contrast     FINDINGS:   Nomenclature is based on 5 lumbar type vertebral bodies. Normal vertebral body heights, alignment and marrow signal. The conus tip is identified at L1. No extraspinal abnormality. The visualized portions of the bony pelvis are normal for age.     T12-L1: Normal disc height and signal. No herniation. No facet arthropathy. No spinal canal stenosis. No right neural foraminal stenosis. No left neural foraminal stenosis.     L1-L2: Mild loss of disc height and signal. Minimal disc bulging. Mild facet arthropathy. No spinal canal stenosis. No right neural foraminal stenosis. No left neural foraminal stenosis.     L2-L3: Mild loss of disc height and signal. Mild disc bulging. Mild facet arthropathy. No spinal canal stenosis. No right neural foraminal stenosis. No left neural foraminal stenosis.     L3-L4: Normal disc height and signal. No herniation. No facet arthropathy. No spinal canal stenosis. No right neural foraminal stenosis. No left neural foraminal stenosis.     L4-L5: Normal disc height and signal. Minimal disc bulging. Mild to moderate facet arthropathy. No spinal canal stenosis. Mild right neural foraminal stenosis. Mild left neural foraminal stenosis.     L5-S1: Normal disc height with mild loss of disc signal. Mild broad-based disc bulging and spurring. Shallow left paracentral disc protrusion. This mildly narrows the left S1 lateral recess. Moderate facet arthropathy. No spinal canal stenosis. Mild   right neural foraminal stenosis. Mild to moderate left neural foraminal stenosis.     IMPRESSION:  CONCLUSION:  1.  At the L5-S1 level there is mild broad-based disc bulging with a superimposed left paracentral small disc protrusion which narrows the left S1 lateral recess and may slightly affect the left S1 root. The central canal remains adequate. There is mild      right and mild to moderate left L5-S1  foraminal narrowing.  2.  No high-grade central canal narrowing in the lumbar region.  3.  Mild degenerative changes in the lumbar discs with mild to moderate facet arthropathy throughout the lumbar facets.      EM-year-old man with chief complaint of chronic numbness and tingling at the right lateral/outer thigh, extending to a couple of inches above the knee.  The paresthesias do not radiate to the lower leg or foot.  EMG was requested to evaluate for right lateral femoral cutaneous neuropathy (meralgia paresthetica) versus upper lumbar radiculopathy at L2-L3.     Techniques:     Sensory nerve conduction studies were done with surface recording electrodes. EMG was done with a concentric needle electrode.      Results:     The left lateral femoral cutaneous antidromic sensory nerve conduction study showed a response with an amplitude of 3  V, and normal conduction velocity.  The right lateral femoral cutaneous antidromic sensory nerve conduction study showed no response.  Needle EMG of the right vastus lateralis, adductor longus, and iliopsoas, was normal.     Interpretation:     Abnormal study.  There is electrodiagnostic evidence of a right lateral femoral cutaneous neuropathy, as seen with meralgia paresthetica.  There is no electrodiagnostic evidence of right L2-L3-L4 radiculopathy.         Diagnosis:  (G57.11) Meralgia paresthetica of right side  (primary encounter diagnosis)  Comment:   Plan:     (G89.29) Chronic intractable pain  Comment:   Plan:         Plan on initial consult on 2024:  A multimodal plan was developed today to treat your pain.  Multimodal analgesia is a strategy that reduces reliance on opioids through the use of non-opioid analgesics and therapies that have different mechanisms of action.      Diagnostics:   Lumbar MRI reviewed today. EMG reviewed today.      Consider getting updated lumbar MRI in the future if pain persists despite conservative treatments.      Medications:  He  wants to stop gabapentin in the future.  He is not interested in medications for this pain.    The following OTC pain medications may be helpful, use as directed: Voltaren Gel 1%, CBD products, Arnica products, Capsaicin products, Australian Dream Cream, Epson It, Lidocaine Patch, Solanpas, Biofreeze, Aspercream, Tiger Balm and Kamlesh Emu cream.  Apply heat or cold PRN.      Therapies:  Discussed anti-inflammatory lifestyle.    Discussed Pain Psychology - consider in the future  Psychological treatments are also important part of pain management.  Understanding and managing the thoughts, emotions and behaviors that accompany the discomfort can help you cope more effectively with your pain and can actually reduce the intensity of your pain.      PHYSICAL THERAPY -  Refer to [facility].  Continue to do exercises learned at PT on a daily basis.  Discussed the importance of core strengthening, ROM, stretching exercises with the patient and how each of these entities is important in decreasing pain.  Explained to the patient that the purpose of physical therapy is to teach the patient a home exercise program.  These exercises need to be performed every day in order to decrease pain and prevent future occurrences of pain.        Consider chiropractic care/massage/dry-needling at [facility].[phone], [address], [place] [address].    Discussed Grounding Mat - handout provided  https://www.youtube.com/watch?v=OnJVCC0Vo7C    Discussed Frequency Specific Microcurrent - handout provided  Treatment for Neuropathic Pain.   [facility] [phone]  [facility] [phone]  [facility] [phone]  [facility] [phone]  May be able to be billed as a chiropractic service depending on your insurance coverage.     Discussed Acupuncture.    Discussed TENs unit.      Interventions:  Consider R) lateral femoral cutaneous nerve block with Dr. Dia in [place].       Follow up:     Return to clinic in 3 months.        YUAN Hyman, RN, CNP, FNP  [facility]        BILLING TIME DOCUMENTATION:   The total TIME spent on this patient on the date of the encounter/appointment was 90 minutes.            Answers submitted by the patient for this visit:  DARBY-7 (Submitted on 8/7/2024)  DARBY 7 TOTAL SCORE: 0

## 2024-08-08 ENCOUNTER — OFFICE VISIT (OUTPATIENT)
Dept: PALLIATIVE MEDICINE | Facility: CLINIC | Age: 59
End: 2024-08-08
Attending: PHYSICIAN ASSISTANT
Payer: COMMERCIAL

## 2024-08-08 VITALS
OXYGEN SATURATION: 98 % | SYSTOLIC BLOOD PRESSURE: 111 MMHG | DIASTOLIC BLOOD PRESSURE: 73 MMHG | HEART RATE: 79 BPM | WEIGHT: 168.1 LBS | BODY MASS INDEX: 24.12 KG/M2

## 2024-08-08 DIAGNOSIS — G57.11 MERALGIA PARESTHETICA OF RIGHT SIDE: Primary | ICD-10-CM

## 2024-08-08 DIAGNOSIS — G89.29 CHRONIC INTRACTABLE PAIN: ICD-10-CM

## 2024-08-08 PROCEDURE — 99205 OFFICE O/P NEW HI 60 MIN: CPT | Performed by: NURSE PRACTITIONER

## 2024-08-08 PROCEDURE — 99417 PROLNG OP E/M EACH 15 MIN: CPT | Performed by: NURSE PRACTITIONER

## 2024-08-08 PROCEDURE — G2211 COMPLEX E/M VISIT ADD ON: HCPCS | Performed by: NURSE PRACTITIONER

## 2024-08-08 ASSESSMENT — PAIN SCALES - GENERAL: PAINLEVEL: NO PAIN (0)

## 2024-08-08 NOTE — PATIENT INSTRUCTIONS
Plan on initial consult on 8/8/2024:  A multimodal plan was developed today to treat your pain.  Multimodal analgesia is a strategy that reduces reliance on opioids through the use of non-opioid analgesics and therapies that have different mechanisms of action.      Diagnostics:   Lumbar MRI reviewed today. EMG reviewed today.      Consider getting updated lumbar MRI in the future if pain persists despite conservative treatments.      Medications:  He wants to stop gabapentin in the future.    The following OTC pain medications may be helpful, use as directed: Voltaren Gel 1%, CBD products, Arnica products, Capsaicin products, Australian Dream Cream, Epson It, Lidocaine Patch, Solanpas, Biofreeze, Aspercream, Tiger Balm and Kamlesh Emu cream.  Apply heat or cold PRN.      Therapies:  Discussed anti-inflammatory lifestyle.    Discussed Pain Psychology - consider in the future  Psychological treatments are also important part of pain management.  Understanding and managing the thoughts, emotions and behaviors that accompany the discomfort can help you cope more effectively with your pain and can actually reduce the intensity of your pain.      PHYSICAL THERAPY -  Refer to [facility].  Continue to do exercises learned at PT on a daily basis.  Discussed the importance of core strengthening, ROM, stretching exercises with the patient and how each of these entities is important in decreasing pain.  Explained to the patient that the purpose of physical therapy is to teach the patient a home exercise program.  These exercises need to be performed every day in order to decrease pain and prevent future occurrences of pain.        Consider chiropractic care/massage/dry-needling at [facility].[phone], [address], [place] [address].    Discussed Grounding Mat - handout provided  https://www.Guanghetang.com/watch?[email or web address]    Discussed Frequency Specific Microcurrent - handout provided  Treatment for  Neuropathic Pain.   [facility] [phone]  [facility] [phone]  [facility] [phone]   chiropractic [phone]  May be able to be billed as a chiropractic service depending on your insurance coverage.     Discussed Acupuncture.    Discussed TENs unit.      Interventions:  Consider R) lateral femoral cutaneous nerve block with Dr. Dia in [place].      Follow up:     Return to clinic in 3 months.        YUAN Hyman, RN, CNP, FNP  [facility] Locations            ----------------------------------------------------------------  Clinic Number:  [phone]   Call with any questions about your care and for scheduling assistance.   Calls are returned Monday through Friday between 8 AM and 4:30 PM. We usually get back to you within 2 business days depending on the issue/request.    If we are prescribing your medications:  For opioid medication refills, call the clinic or send a Sosh message 7 days in advance.  Please include:  Name of requested medication  Name of the pharmacy.  For non-opioid medications, call your pharmacy directly to request a refill. Please allow 3-4 days to be processed.   Per [place] State Law:  All controlled substance prescriptions must be filled within 30 days of being written.    For those controlled substances allowing refills, pickup must occur within 30 days of last fill.      We believe regular attendance is key to your success in our program!    Any time you are unable to keep your appointment we ask that you call us at least 24 hours in advance to cancel.This will allow us to offer the appointment time to another patient.   Multiple missed appointments may lead to dismissal from the clinic.

## 2024-08-24 ENCOUNTER — MYC REFILL (OUTPATIENT)
Dept: INTERNAL MEDICINE | Facility: CLINIC | Age: 59
End: 2024-08-24
Payer: COMMERCIAL

## 2024-08-24 DIAGNOSIS — F90.2 ATTENTION DEFICIT HYPERACTIVITY DISORDER (ADHD), COMBINED TYPE: ICD-10-CM

## 2024-08-26 RX ORDER — DEXTROAMPHETAMINE SACCHARATE, AMPHETAMINE ASPARTATE, DEXTROAMPHETAMINE SULFATE AND AMPHETAMINE SULFATE 2.5; 2.5; 2.5; 2.5 MG/1; MG/1; MG/1; MG/1
10 TABLET ORAL DAILY
Qty: 30 TABLET | Refills: 0 | Status: SHIPPED | OUTPATIENT
Start: 2024-08-26

## 2024-10-17 ENCOUNTER — MYC REFILL (OUTPATIENT)
Dept: INTERNAL MEDICINE | Facility: CLINIC | Age: 59
End: 2024-10-17
Payer: COMMERCIAL

## 2024-10-17 DIAGNOSIS — F90.2 ATTENTION DEFICIT HYPERACTIVITY DISORDER (ADHD), COMBINED TYPE: ICD-10-CM

## 2024-10-18 RX ORDER — DEXTROAMPHETAMINE SACCHARATE, AMPHETAMINE ASPARTATE, DEXTROAMPHETAMINE SULFATE AND AMPHETAMINE SULFATE 2.5; 2.5; 2.5; 2.5 MG/1; MG/1; MG/1; MG/1
10 TABLET ORAL DAILY
Qty: 30 TABLET | Refills: 0 | Status: SHIPPED | OUTPATIENT
Start: 2024-10-18

## 2024-10-26 DIAGNOSIS — R93.1 ELEVATED CORONARY ARTERY CALCIUM SCORE: ICD-10-CM

## 2024-10-28 RX ORDER — ROSUVASTATIN CALCIUM 20 MG/1
20 TABLET, COATED ORAL DAILY
Qty: 90 TABLET | Refills: 0 | Status: SHIPPED | OUTPATIENT
Start: 2024-10-28

## 2024-11-03 DIAGNOSIS — R93.1 ELEVATED CORONARY ARTERY CALCIUM SCORE: ICD-10-CM

## 2024-11-04 RX ORDER — ROSUVASTATIN CALCIUM 20 MG/1
20 TABLET, COATED ORAL DAILY
Qty: 90 TABLET | Refills: 0 | OUTPATIENT
Start: 2024-11-04

## 2024-12-10 ENCOUNTER — PATIENT OUTREACH (OUTPATIENT)
Dept: CARE COORDINATION | Facility: CLINIC | Age: 59
End: 2024-12-10
Payer: COMMERCIAL

## 2024-12-17 ENCOUNTER — MYC REFILL (OUTPATIENT)
Dept: INTERNAL MEDICINE | Facility: CLINIC | Age: 59
End: 2024-12-17
Payer: COMMERCIAL

## 2024-12-17 DIAGNOSIS — F90.2 ATTENTION DEFICIT HYPERACTIVITY DISORDER (ADHD), COMBINED TYPE: ICD-10-CM

## 2024-12-18 ENCOUNTER — MYC REFILL (OUTPATIENT)
Dept: INTERNAL MEDICINE | Facility: CLINIC | Age: 59
End: 2024-12-18
Payer: COMMERCIAL

## 2024-12-18 DIAGNOSIS — G25.81 RESTLESS LEGS SYNDROME (RLS): ICD-10-CM

## 2024-12-18 RX ORDER — DEXTROAMPHETAMINE SACCHARATE, AMPHETAMINE ASPARTATE, DEXTROAMPHETAMINE SULFATE AND AMPHETAMINE SULFATE 2.5; 2.5; 2.5; 2.5 MG/1; MG/1; MG/1; MG/1
10 TABLET ORAL DAILY
Qty: 30 TABLET | Refills: 0 | Status: SHIPPED | OUTPATIENT
Start: 2024-12-18

## 2024-12-19 RX ORDER — GABAPENTIN 100 MG/1
400 CAPSULE ORAL AT BEDTIME
Qty: 360 CAPSULE | Refills: 0 | Status: SHIPPED | OUTPATIENT
Start: 2024-12-19

## 2024-12-24 ENCOUNTER — PATIENT OUTREACH (OUTPATIENT)
Dept: CARE COORDINATION | Facility: CLINIC | Age: 59
End: 2024-12-24
Payer: COMMERCIAL

## 2024-12-26 ENCOUNTER — TELEPHONE (OUTPATIENT)
Dept: INTERNAL MEDICINE | Facility: CLINIC | Age: 59
End: 2024-12-26
Payer: COMMERCIAL

## 2024-12-26 NOTE — TELEPHONE ENCOUNTER
Patient called in stating his pharmacy will not refill his gabapentin for several more weeks. When the patient was asked their reasoning, he states it is due to him not using the medication as prescribed. He states he was formerly on 900 mg gabapentin daily which was reduced to 400 mg. He stated that this dosage was not working well for him, so he was taking 500 - 600 mg per day instead of 400 mg.    Due to this, the patient ran out of medications earlier than he was intended to. He would like his PCP to review and approve for the pharmacy to fill his medication early as he is out. Please review and determine the next steps as appropriate.    Bo Rojas RN  [facility]

## 2025-01-06 ENCOUNTER — MYC MEDICAL ADVICE (OUTPATIENT)
Dept: INTERNAL MEDICINE | Facility: CLINIC | Age: 60
End: 2025-01-06
Payer: COMMERCIAL

## 2025-01-07 ENCOUNTER — OFFICE VISIT (OUTPATIENT)
Dept: INTERNAL MEDICINE | Facility: CLINIC | Age: 60
End: 2025-01-07
Payer: COMMERCIAL

## 2025-01-07 VITALS
RESPIRATION RATE: 16 BRPM | DIASTOLIC BLOOD PRESSURE: 62 MMHG | HEART RATE: 71 BPM | BODY MASS INDEX: 24.05 KG/M2 | OXYGEN SATURATION: 99 % | TEMPERATURE: 97.7 F | SYSTOLIC BLOOD PRESSURE: 106 MMHG | HEIGHT: 70 IN | WEIGHT: 168 LBS

## 2025-01-07 DIAGNOSIS — J01.90 ACUTE SINUSITIS WITH SYMPTOMS > 10 DAYS: Primary | ICD-10-CM

## 2025-01-07 PROCEDURE — 99213 OFFICE O/P EST LOW 20 MIN: CPT | Performed by: INTERNAL MEDICINE

## 2025-01-07 PROCEDURE — G2211 COMPLEX E/M VISIT ADD ON: HCPCS | Performed by: INTERNAL MEDICINE

## 2025-01-07 RX ORDER — CEFDINIR 300 MG/1
300 CAPSULE ORAL 2 TIMES DAILY
Qty: 20 CAPSULE | Refills: 0 | Status: SHIPPED | OUTPATIENT
Start: 2025-01-07 | End: 2025-01-17

## 2025-01-07 NOTE — PROGRESS NOTES
Assessment & Plan     Acute sinusitis with symptoms > 10 days  59-year-old male who developed viral URI symptoms in late November with sore throat and cough.  The symptoms have gradually improved but he has had persistent sinus congestion and is now having purulent drainage that is foul smelling.  Some pressure in his left ear.  Exam with lungs clear.  Oropharynx normal.  TMs normal.  O2 sats 99% on room air.  I suspect that he has developed a secondary bacterial sinus infection following viral URI and given persistence of symptoms I would recommend treating with cefdinir 300 mg twice daily for 10 days.  We discussed eating yogurt daily while on the antibiotic to prevent GI side effects.  - cefdinir (OMNICEF) 300 MG capsule; Take 1 capsule (300 mg) by mouth 2 times daily for 10 days.    ADHD  He continues on Adderall 10 mg daily.  He is reminded to schedule an annual physical to continue refills.      We discussed getting Prevnar 20 completed this winter as guidelines have changed recommended for anyone 50 and older    The longitudinal plan of care for the diagnosis(es)/condition(s) as documented were addressed during this visit. Due to the added complexity in care, I will continue to support Max in the subsequent management and with ongoing continuity of care.     Follow-up in the next 3 months for an annual physical    Subjective   Max is a 59 year old, presenting for the following health issues: See assessment and plan for details  Sinus Problem (I have very watery drainage in my sinuses, when I lean forward, it smells like mothballs. When I first lean forward, the mucus is clear, however, if I continue to do so, My sinuses drain mucus greenish in color. /My sinuses have not Cleared up since late, November, when I got a severe respiratory cold, possibly pneumonia (The only time I've experienced a cough this bad is with pneumonia). The cough and lungs started to clear up towards mid Dec. /I feel fine, and can  "breathe freely. At times I still ha)      1/7/2025     1:52 PM   Additional Questions   Roomed by      Sinus Problem     History of Present Illness       Reason for visit:  Possible sinus infection  Symptom onset:  More than a month  Symptoms include:  Nasal drainage  Symptom intensity:  Moderate  Symptom progression:  Staying the same  Had these symptoms before:  No  What makes it worse:  Bending over causes drainage  What makes it better:  No   He is taking medications regularly.                 Review of Systems  Fever and chills have resolved      Objective    /62 (BP Location: Right arm, Patient Position: Sitting, Cuff Size: Adult Regular)   Pulse 71   Temp 97.7  F (36.5  C) (Oral)   Resp 16   Ht 1.778 m (5' 10\")   Wt 76.2 kg (168 lb)   SpO2 99%   BMI 24.11 kg/m    Body mass index is 24.11 kg/m .  Physical Exam     Well-appearing middle-age male  Normal TMs.  Normal oropharynx  No cervical lymphadenopathy no sinus tenderness to palpation    Lungs clear bilaterally          Signed Electronically by: Ash Guardado MD    "

## 2025-01-28 ENCOUNTER — TELEPHONE (OUTPATIENT)
Dept: INTERNAL MEDICINE | Facility: CLINIC | Age: 60
End: 2025-01-28
Payer: COMMERCIAL

## 2025-01-28 NOTE — TELEPHONE ENCOUNTER
General Call    Contacts       Contact Date/Time Type Contact Phone/Fax    01/28/2025 11:01 AM CST Phone (Incoming) Max Piper (Self) [phone] (M)          Reason for Call:  pt requests lab orders prior to appt 2/24/25 and wondering if he needs a colonoscopy prior to appt as well.    What are your questions or concerns:  n/a    Date of last appointment with provider: n/a pt has appt scheduled 2/24/25    Could we send this information to you in [employer] or would you prefer to receive a phone call?:   Patient would like to be contacted via [employer]

## 2025-02-03 DIAGNOSIS — Z00.00 HEALTHCARE MAINTENANCE: Primary | ICD-10-CM

## 2025-02-03 NOTE — TELEPHONE ENCOUNTER
Outgoing call to patient, relayed Dr. Guardado's message. Patient will schedule lab appointment at [place] once he has he schedule in front of him prior to his appointment with Dr. Guardado.     Margoth NAVARRO RN

## 2025-02-03 NOTE — TELEPHONE ENCOUNTER
Future lab orders are placed and he can schedule a fasting lab appointment.  His colonoscopy will be due after May 2025 and we can discuss at his upcoming appointment

## 2025-02-09 ENCOUNTER — MYC REFILL (OUTPATIENT)
Dept: INTERNAL MEDICINE | Facility: CLINIC | Age: 60
End: 2025-02-09
Payer: COMMERCIAL

## 2025-02-09 DIAGNOSIS — R93.1 ELEVATED CORONARY ARTERY CALCIUM SCORE: ICD-10-CM

## 2025-02-10 RX ORDER — ROSUVASTATIN CALCIUM 20 MG/1
20 TABLET, COATED ORAL DAILY
Qty: 90 TABLET | Refills: 3 | Status: SHIPPED | OUTPATIENT
Start: 2025-02-10

## 2025-02-12 ENCOUNTER — PATIENT OUTREACH (OUTPATIENT)
Dept: CARE COORDINATION | Facility: CLINIC | Age: 60
End: 2025-02-12
Payer: COMMERCIAL

## 2025-02-24 ENCOUNTER — LAB (OUTPATIENT)
Dept: LAB | Facility: CLINIC | Age: 60
End: 2025-02-24
Payer: COMMERCIAL

## 2025-02-24 ENCOUNTER — OFFICE VISIT (OUTPATIENT)
Dept: INTERNAL MEDICINE | Facility: CLINIC | Age: 60
End: 2025-02-24
Payer: COMMERCIAL

## 2025-02-24 VITALS
BODY MASS INDEX: 24.2 KG/M2 | OXYGEN SATURATION: 97 % | HEIGHT: 70 IN | WEIGHT: 169 LBS | DIASTOLIC BLOOD PRESSURE: 70 MMHG | TEMPERATURE: 97.8 F | RESPIRATION RATE: 18 BRPM | SYSTOLIC BLOOD PRESSURE: 122 MMHG | HEART RATE: 65 BPM

## 2025-02-24 DIAGNOSIS — Z00.00 ROUTINE GENERAL MEDICAL EXAMINATION AT A HEALTH CARE FACILITY: Primary | ICD-10-CM

## 2025-02-24 DIAGNOSIS — R93.1 ELEVATED CORONARY ARTERY CALCIUM SCORE: ICD-10-CM

## 2025-02-24 DIAGNOSIS — G47.00 INSOMNIA, UNSPECIFIED TYPE: ICD-10-CM

## 2025-02-24 DIAGNOSIS — F90.2 ATTENTION DEFICIT HYPERACTIVITY DISORDER (ADHD), COMBINED TYPE: ICD-10-CM

## 2025-02-24 DIAGNOSIS — G57.11 MERALGIA PARESTHETICA OF RIGHT SIDE: ICD-10-CM

## 2025-02-24 DIAGNOSIS — G25.81 RESTLESS LEGS SYNDROME (RLS): ICD-10-CM

## 2025-02-24 DIAGNOSIS — Z00.00 HEALTHCARE MAINTENANCE: ICD-10-CM

## 2025-02-24 PROBLEM — J01.90 ACUTE SINUSITIS WITH SYMPTOMS > 10 DAYS: Status: RESOLVED | Noted: 2025-01-07 | Resolved: 2025-02-24

## 2025-02-24 LAB
ALBUMIN UR-MCNC: NEGATIVE MG/DL
APPEARANCE UR: CLEAR
BILIRUB UR QL STRIP: NEGATIVE
COLOR UR AUTO: YELLOW
ERYTHROCYTE [DISTWIDTH] IN BLOOD BY AUTOMATED COUNT: 12.5 % (ref 10–15)
GLUCOSE UR STRIP-MCNC: NEGATIVE MG/DL
HCT VFR BLD AUTO: 45.8 % (ref 40–53)
HGB BLD-MCNC: 15.6 G/DL (ref 13.3–17.7)
HGB UR QL STRIP: NEGATIVE
KETONES UR STRIP-MCNC: NEGATIVE MG/DL
LEUKOCYTE ESTERASE UR QL STRIP: NEGATIVE
MCH RBC QN AUTO: 29.5 PG (ref 26.5–33)
MCHC RBC AUTO-ENTMCNC: 34.1 G/DL (ref 31.5–36.5)
MCV RBC AUTO: 87 FL (ref 78–100)
NITRATE UR QL: NEGATIVE
PH UR STRIP: 7 [PH] (ref 5–7)
PLATELET # BLD AUTO: 238 10E3/UL (ref 150–450)
RBC # BLD AUTO: 5.29 10E6/UL (ref 4.4–5.9)
SP GR UR STRIP: 1.01 (ref 1–1.03)
UROBILINOGEN UR STRIP-ACNC: 0.2 E.U./DL
WBC # BLD AUTO: 5 10E3/UL (ref 4–11)

## 2025-02-24 PROCEDURE — 81003 URINALYSIS AUTO W/O SCOPE: CPT

## 2025-02-24 PROCEDURE — 83036 HEMOGLOBIN GLYCOSYLATED A1C: CPT | Performed by: INTERNAL MEDICINE

## 2025-02-24 PROCEDURE — 85027 COMPLETE CBC AUTOMATED: CPT

## 2025-02-24 PROCEDURE — 80053 COMPREHEN METABOLIC PANEL: CPT

## 2025-02-24 PROCEDURE — 36415 COLL VENOUS BLD VENIPUNCTURE: CPT

## 2025-02-24 PROCEDURE — 99396 PREV VISIT EST AGE 40-64: CPT | Performed by: INTERNAL MEDICINE

## 2025-02-24 PROCEDURE — 80061 LIPID PANEL: CPT

## 2025-02-24 PROCEDURE — 99214 OFFICE O/P EST MOD 30 MIN: CPT | Mod: 25 | Performed by: INTERNAL MEDICINE

## 2025-02-24 PROCEDURE — G0103 PSA SCREENING: HCPCS

## 2025-02-24 PROCEDURE — G2211 COMPLEX E/M VISIT ADD ON: HCPCS | Performed by: INTERNAL MEDICINE

## 2025-02-24 RX ORDER — DEXTROAMPHETAMINE SACCHARATE, AMPHETAMINE ASPARTATE, DEXTROAMPHETAMINE SULFATE AND AMPHETAMINE SULFATE 2.5; 2.5; 2.5; 2.5 MG/1; MG/1; MG/1; MG/1
10 TABLET ORAL DAILY
Qty: 30 TABLET | Refills: 0 | Status: SHIPPED | OUTPATIENT
Start: 2025-02-24

## 2025-02-24 RX ORDER — GABAPENTIN 100 MG/1
500-600 CAPSULE ORAL AT BEDTIME
Qty: 540 CAPSULE | Refills: 3 | Status: SHIPPED | OUTPATIENT
Start: 2025-02-24

## 2025-02-24 SDOH — HEALTH STABILITY: PHYSICAL HEALTH: ON AVERAGE, HOW MANY DAYS PER WEEK DO YOU ENGAGE IN MODERATE TO STRENUOUS EXERCISE (LIKE A BRISK WALK)?: 7 DAYS

## 2025-02-24 ASSESSMENT — SOCIAL DETERMINANTS OF HEALTH (SDOH): HOW OFTEN DO YOU GET TOGETHER WITH FRIENDS OR RELATIVES?: TWICE A WEEK

## 2025-02-24 ASSESSMENT — PAIN SCALES - GENERAL: PAINLEVEL_OUTOF10: NO PAIN (0)

## 2025-02-24 NOTE — PATIENT INSTRUCTIONS
Patient Education   Preventive Care Advice   This is general advice given by our system to help you stay healthy. However, your care team may have specific advice just for you. Please talk to your care team about your preventive care needs.  Nutrition  Eat 5 or more servings of fruits and vegetables each day.  Try wheat bread, brown rice and whole grain pasta (instead of white bread, rice, and pasta).  Get enough calcium and vitamin D. Check the label on foods and aim for 100% of the RDA (recommended daily allowance).  Lifestyle  Exercise at least 150 minutes each week  (30 minutes a day, 5 days a week).  Do muscle strengthening activities 2 days a week. These help control your weight and prevent disease.  No smoking.  Wear sunscreen to prevent skin cancer.  Continue to see your dermatologist every year  Have a dental exam and cleaning every 6 months.  Annual eye exam  Yearly exams  See your health care team every year to talk about:  Any changes in your health.  Any medicines your care team has prescribed.  Preventive care, family planning, and ways to prevent chronic diseases.  Shots (vaccines)   COVID-19 shot: Recommended  Flu shot: Get a flu shot every year.  Tetanus shot: Get a tetanus shot every 10 years.  You are due for a tetanus booster before June 2025.  I would get this at your pharmacy [facility]  Prevnar 20 is recommended.  You should check with your insurance regarding coverage or get [facility] if offered  Shingles shot (for age 50 and up).  Completed  General health tests  Diabetes screening:  Cholesterol test: Annually  Hepatitis C: Get tested at least once in your life.    Cancer screening tests  Colon cancer screening: It is important to start screening for colon cancer at age 45.  Colonoscopy will be due this spring  Prostate cancer screening test: Annual PSA  For informational purposes only. Not to replace the advice of your health care provider. Copyright   2023 [facility]. All rights reserved. Clinically reviewed by the [facility]. [employer] [id number] - REV 01/24.

## 2025-02-24 NOTE — PROGRESS NOTES
Preventive Care Visit  [facility]  Ash Guardado MD, Internal Medicine  Feb 24, 2025      Assessment & Plan     Routine general medical examination at a health care facility  Immunizations are reviewed and recommending Prevnar 20 and getting a tetanus booster.  [facility]. Non-smoker.  Uses alcohol in moderation.  Regular exercise and healthy diet habits to maintain a healthy weight discussed.  Up to date with colonoscopies and this should be repeated in spring 2025.  Prostate exam is deferred but I will check a PSA for prostate cancer screening.   He sees his ophthalmologist every year.  Regular dental visits every 6 months discussed.  Skin exam performed and recommending regular use of sunblock.  We discussed seeing a dermatologist every year. Hepatitis C antibody for screening was normal.  Will screen for diabetes with fasting glucose.  Checking fasting lipid profile.       Attention deficit hyperactivity disorder (ADHD), combined type  ADHD symptoms are managed generally well using 10 mg of Adderall.  We tried switching to Strattera last year but discussed side effects.  There is some question whether Adderall may be contributing to problems sleeping but he has ongoing issues sleeping whether he takes the medication or not.  Will continue the current dose given the benefits that it is providing.  - amphetamine-dextroamphetamine (ADDERALL) 10 MG tablet; Take 1 tablet (10 mg) by mouth daily.    Elevated coronary artery calcium score  Elevated coronary calcium score now taking rosuvastatin tolerating without side effects.  Lipid profile collected this morning and results are pending.  Goal is to keep LDL under 70.  Will plan to repeat his CT coronary calcium score next year for years after his original study    Meralgia paresthetica of right side  Ongoing symptoms involving right anterior thigh with EMG confirming diagnosis of meralgia paresthetica.  He has  tried physical therapy and other treatment modalities which have really not been helpful.  Gabapentin is helpful and he needs up to 600 mg at night.  He might be a candidate for a lateral femoral cutaneous nerve block as suggested by the pain clinic and I am recommending that he pursue     Restless legs syndrome (RLS)  Managing restless leg symptoms with gabapentin  - gabapentin (NEURONTIN) 100 MG capsule; Take 5-6 capsules (500-600 mg) by mouth at bedtime.    Insomnia, unspecified type  Insomnia as a result of the above issues with restless leg and meralgia paresthetica      The longitudinal plan of care for the diagnosis(es)/condition(s) as documented were addressed during this visit. Due to the added complexity in care, I will continue to support Max in the subsequent management and with ongoing continuity of care.     Patient has been advised of split billing requirements and indicates understanding: Yes        Counseling  Appropriate preventive services were addressed with this patient via screening, questionnaire, or discussion as appropriate for fall prevention, nutrition, physical activity, Tobacco-use cessation, social engagement, weight loss and cognition.  Checklist reviewing preventive services available has been given to the patient.  Reviewed patient's diet, addressing concerns and/or questions.       See Patient Instructions    Anitra Santana is a 59 year old, presenting for the following: Here for his annual physical and follow-up medical problems including meralgia paresthetica and ADHD   And other concerns.  See assessment and plan for details  Physical (Had labs done this morning, not fasting)             Health Care Directive  Patient does not have a Health Care Directive:       2/24/2025   General Health   How would you rate your overall physical health? Excellent   Feel stress (tense, anxious, or unable to sleep) Not at all         2/24/2025   Nutrition   Three or more servings of calcium  each day? Yes   Diet: Regular (no restrictions)   How many servings of fruit and vegetables per day? 4 or more   How many sweetened beverages each day? 0-1         2/24/2025   Exercise   Days per week of moderate/strenous exercise 7 days         2/24/2025   Social Factors   Frequency of gathering with friends or relatives Twice a week   Worry food won't last until get money to buy more Patient declined   Food not last or not have enough money for food? Patient declined   Do you have housing? (Housing is defined as stable permanent housing and does not include staying ouside in a car, in a tent, in an abandoned building, in an overnight shelter, or couch-surfing.) Patient declined   Are you worried about losing your housing? Patient declined   Lack of transportation? Patient declined   Unable to get utilities (heat,electricity)? Patient declined         2/24/2025   Fall Risk   Fallen 2 or more times in the past year? No   Trouble with walking or balance? No          2/24/2025   Dental   Dentist two times every year? Yes              Today's PHQ-2 Score:       1/7/2025    11:29 AM   PHQ-2 ( 1999 [employer])   Q1: Little interest or pleasure in doing things 0   Q2: Feeling down, depressed or hopeless 0   PHQ-2 Score 0    Q1: Little interest or pleasure in doing things Not at all   Q2: Feeling down, depressed or hopeless Not at all   PHQ-2 Score 0       Patient-reported         2/24/2025   Substance Use   Alcohol more than 3/day or more than 7/wk No   Do you use any other substances recreationally? No     Social History     Tobacco Use    Smoking status: Never     Passive exposure: Never    Smokeless tobacco: Never   Vaping Use    Vaping status: Never Used   Substance Use Topics    Alcohol use: Yes     Comment: 1-2/ night    Drug use: Never           2/24/2025   STI Screening   New sexual partner(s) since last STI/HIV test? No   Last PSA:   Prostate Specific Antigen Screen   Date Value Ref Range Status   12/29/2023 0.69  0.00 - 3.50 ng/mL Final   10/01/2021 0.61 0.00 - 3.50 ug/L Final     ASCVD Risk   The 10-year ASCVD risk score (Brenda ROME, et al., 2019) is: 4.1%    Values used to calculate the score:      Age: 59 years      Sex: Male      Is Non- : No      Diabetic: No      Tobacco smoker: No      Systolic Blood Pressure: 122 mmHg      Is BP treated: No      HDL Cholesterol: 70 mg/dL      Total Cholesterol: 142 mg/dL           Reviewed and updated as needed this visit by Provider                    Past Medical History:   Diagnosis Date    Abdominal pain, left upper quadrant     EGD normal  2012    Acute sinusitis with symptoms > 10 days 01/07/2025    Attention deficit hyperactivity disorder (ADHD), combined type 01/06/2023    Bat bite wound 01/01/2020    Possible parathyroid.  Underwent rabies vaccination    Cervical disc herniation     Chronic LBP     Chronic left shoulder pain     labral tear    Chronic low back pain 08/10/2020    Formatting of this note might be different from the original. Replacement Utility updated for latest IMO load Formatting of this note might be different from the original. Replacement Utility updated for latest IMO load    Chronic maxillary sinusitis     ENT evaluation February 2015    Chronic sore throat 09/28/2020    Complete tear, knee, anterior cruciate ligament 10/01/2021    Eczema     Elevated coronary artery calcium score 02/10/2023    CT coronary calcium score 130 placing him in the 79th percentile compared to men his age.  Recommending statin and aspirin    Facial trauma 01/01/2015    Globus sensation 01/01/2014    normal ENT evaluation    Influenza A 01/01/2014    Insomnia 02/24/2025    Meralgia paresthetica of right side 09/28/2020    Recurrent pneumonia     IgG level normal 2015    Restless legs syndrome (RLS) 10/01/2021    Right hamstring injury 05/26/2016    Rupture of anterior cruciate ligament of right knee 04/29/2021    surgery 2021    Tinnitus  "01/01/2012    ENT evaluation     Past Surgical History:   Procedure Laterality Date    ARTHROSCOPIC REPAIR ACL Right     2021    COLONOSCOPY      Normal May 2015    SKIN SURGERY      Birthmark removal    WISDOM TOOTH EXTRACTION           Review of Systems  Constitutional, HEENT, cardiovascular, pulmonary, GI, , musculoskeletal, neuro, skin, endocrine and psych systems are negative, except as otherwise noted.     Objective    Exam  /70   Pulse 65   Temp 97.8  F (36.6  C) (Oral)   Resp 18   Ht 1.778 m (5' 10\")   Wt 76.7 kg (169 lb)   SpO2 97%   BMI 24.25 kg/m     Estimated body mass index is 24.25 kg/m  as calculated from the following:    Height as of this encounter: 1.778 m (5' 10\").    Weight as of this encounter: 76.7 kg (169 lb).    Physical Exam  EYES: Eyelids, conjunctiva, and sclera were normal. Pupils were normal. Cornea, iris, and lens were normal bilaterally.  HEAD, EARS, NOSE, MOUTH, AND THROAT: Head and face were normal. TMs and external auditory canals are normal.  Oropharynx normal  NECK: Neck appearance was normal. There were no neck masses and the thyroid was not enlarged and no nodules are felt.  No lymphadenopathy.  RESPIRATORY: Breathing pattern was normal and the chest moved symmetrically.   Lung sounds were normal and there were no rales or wheezes.  CARDIOVASCULAR: Heart rate and rhythm were normal.  S1 and S2 were normal and there were no extra sounds or murmurs. Peripheral pulses in arms and legs were normal.  There was no peripheral edema.  No carotid bruits.  GASTROINTESTINAL:  Bowel sounds were present.   Palpation detected no tenderness, mass, or enlarged organs.   RECTAL/PROSTATE: Deferred  MUSCULOSKELETAL: Skeletal configuration was normal and muscle mass was normal for age. Joint appearance was overall normal.  LYMPHATIC: There were no enlarged nodes.  SKIN/HAIR/NAILS: Skin color was normal.  There were no concerning skin lesions.  NEUROLOGIC: The patient was alert and " oriented to person, place, time, and circumstance. Speech was normal. Cranial nerves were normal. Motor strength was normal for age. The patient was normally coordinated.  Sensation intact.  PSYCHIATRIC:  Mood and affect were normal and the patient had normal recent and remote memory. The patient's judgment and insight were normal.         Signed Electronically by: Ash Guardado MD

## 2025-02-25 DIAGNOSIS — R73.01 IMPAIRED FASTING GLUCOSE: Primary | ICD-10-CM

## 2025-02-25 LAB
ALBUMIN SERPL BCG-MCNC: 4.4 G/DL (ref 3.5–5.2)
ALP SERPL-CCNC: 56 U/L (ref 40–150)
ALT SERPL W P-5'-P-CCNC: 34 U/L (ref 0–70)
ANION GAP SERPL CALCULATED.3IONS-SCNC: 9 MMOL/L (ref 7–15)
AST SERPL W P-5'-P-CCNC: 31 U/L (ref 0–45)
BILIRUB SERPL-MCNC: 0.7 MG/DL
BUN SERPL-MCNC: 16.3 MG/DL (ref 8–23)
CALCIUM SERPL-MCNC: 9.2 MG/DL (ref 8.8–10.4)
CHLORIDE SERPL-SCNC: 105 MMOL/L (ref 98–107)
CHOLEST SERPL-MCNC: 134 MG/DL
CREAT SERPL-MCNC: 0.99 MG/DL (ref 0.67–1.17)
EGFRCR SERPLBLD CKD-EPI 2021: 88 ML/MIN/1.73M2
EST. AVERAGE GLUCOSE BLD GHB EST-MCNC: 94 MG/DL
FASTING STATUS PATIENT QL REPORTED: ABNORMAL
FASTING STATUS PATIENT QL REPORTED: NORMAL
GLUCOSE SERPL-MCNC: 106 MG/DL (ref 70–99)
HBA1C MFR BLD: 4.9 % (ref 0–5.6)
HCO3 SERPL-SCNC: 27 MMOL/L (ref 22–29)
HDLC SERPL-MCNC: 64 MG/DL
LDLC SERPL CALC-MCNC: 58 MG/DL
NONHDLC SERPL-MCNC: 70 MG/DL
POTASSIUM SERPL-SCNC: 5.3 MMOL/L (ref 3.4–5.3)
PROT SERPL-MCNC: 6.8 G/DL (ref 6.4–8.3)
PSA SERPL DL<=0.01 NG/ML-MCNC: 0.71 NG/ML (ref 0–3.5)
SODIUM SERPL-SCNC: 141 MMOL/L (ref 135–145)
TRIGL SERPL-MCNC: 58 MG/DL

## 2025-03-17 ENCOUNTER — MYC REFILL (OUTPATIENT)
Dept: INTERNAL MEDICINE | Facility: CLINIC | Age: 60
End: 2025-03-17
Payer: COMMERCIAL

## 2025-03-17 DIAGNOSIS — G25.81 RESTLESS LEGS SYNDROME (RLS): ICD-10-CM

## 2025-03-18 RX ORDER — GABAPENTIN 100 MG/1
500-600 CAPSULE ORAL AT BEDTIME
Qty: 540 CAPSULE | Refills: 3 | Status: SHIPPED | OUTPATIENT
Start: 2025-03-18

## 2025-04-03 ENCOUNTER — TRANSFERRED RECORDS (OUTPATIENT)
Dept: HEALTH INFORMATION MANAGEMENT | Facility: CLINIC | Age: 60
End: 2025-04-03
Payer: COMMERCIAL

## 2025-05-16 ENCOUNTER — TRANSFERRED RECORDS (OUTPATIENT)
Dept: GASTROENTEROLOGY | Facility: CLINIC | Age: 60
End: 2025-05-16
Payer: COMMERCIAL

## 2025-05-20 PROBLEM — D12.6 ADENOMATOUS POLYP OF COLON: Status: ACTIVE | Noted: 2025-05-20

## 2025-05-20 NOTE — PROCEDURES
[facility]   [address] [place] [address], [address], [place] [address]     Patient Name: Murray Piper  Gender:  Male  Exam Date: 05/16/2025 Visit Number:  [id number]  Age: 60 Years YOB: 1965  Attending MD: Pal Lopez MD Medical Record#:  [id number]    Procedure: Colonoscopy   Indications: Colorectal cancer screening      Referring MD: Referral Self  Primary MD:      Ash Guardado MD  Medications: Admitting Medications:   0.9% Normal Saline at TKO   Intra Procedure Medications:   Patient received monitored anesthesia care.     Complications: No immediate complications  ______________________________________________________________________________  Procedure:   An examination of the heart and lungs was performed and found to be within acceptable limits.  .  The patient was therefore deemed a reasonable candidate for endoscopy and sedation.   The risks and benefits of the procedure were explained to the patient.After obtaining informed consent, the patient received monitored anesthesia care and I passed the scope   without difficulty via the rectum to the ileum.  The appendiceal orifice and ic valve were identified.  The scope was retroflexed during the examination  The quality of the prep was excellent  (Miralax/Gatorade/2 tablets Bisacodyl/Magnesium Citrate).    This was a complete examination throughout the entire colon.    Findings:    Polyp location: ascending colon.  Quantity: 1.  Size: 3 mm.  Polyp shape:  sessile.         Maneuver: polypectomy was performed with a cold snare.       Removal:  complete.  Retrieval: complete.  Bleeding: none.    Polyp location: sigmoid.  Quantity: 2.  Size:  3-4 mm.  Polyp shape: sessile.         Maneuver: polypectomy was performed with a  cold snare.       Removal: complete.  Retrieval: complete.  Bleeding: none.    Impression:  Colorectal polyps    Preliminary Plan:  The patient and their physician will receive a copy of the pathology  report as well as pathology-based recommendations for future screening or surveillance.  Pathology Results:  A: COLON, ASCENDING, POLYP:           1. Tubular adenoma           2. Negative for high grade dysplasia           3. Per the colonoscopy report:               a. Polyp size: 3 mm               b. Resection: Complete               c. Retrieval: Complete      B: COLON, SIGMOID, POLYPS:           1. Tubular adenoma (1) and hyperplastic polyp (1)           2. Negative for high grade dysplasia           3. Per the colonoscopy report:               a. Polyp sizes: 3 mm - 4 mm               b. Resection: Complete               c. Retrieval: Complete      COMMENTS  Case reviewed with Dr. Clarke.      MICROSCOPIC  A: Performed   B: Performed   SPECIAL STAINING/DEEPER  A: Deeper   B: Deeper     Electronically signed by: Justin Clarke MD    Interpreted at [facility], [address] [place] [id number]    Orders    Instruction(s)/Education:  Instruction/Education Timeframe Assessment   Colon Cancer Prevention  K63.5   Colon Polyps  K63.5       Final Plan:  Repeat colonoscopy in 7 years.    We will attempt to contact you at appropriate intervals via [place] mail.  We may not be able to find you or contact you at that time, therefore you should know that the responsibility for following our recommendation rests with you.  If you don't hear from us at the time your procedure is due, please contact our office to schedule an appointment.  If your contact information should change, please contact our office so that we can update your record.  Additional Comments:  I will schedule a repeat exam in seven years. If you are uncomfortable waiting that long, it could be done in five years.      _Electronically signed by:___________________  Pal Lopez MD                 05/16/2025    cc: Ash Guardado MD

## 2025-05-21 ENCOUNTER — PATIENT OUTREACH (OUTPATIENT)
Dept: GASTROENTEROLOGY | Facility: CLINIC | Age: 60
End: 2025-05-21
Payer: COMMERCIAL

## 2025-08-18 ENCOUNTER — MYC REFILL (OUTPATIENT)
Dept: INTERNAL MEDICINE | Facility: CLINIC | Age: 60
End: 2025-08-18
Payer: COMMERCIAL

## 2025-08-18 DIAGNOSIS — F90.2 ATTENTION DEFICIT HYPERACTIVITY DISORDER (ADHD), COMBINED TYPE: ICD-10-CM

## 2025-08-19 RX ORDER — DEXTROAMPHETAMINE SACCHARATE, AMPHETAMINE ASPARTATE, DEXTROAMPHETAMINE SULFATE AND AMPHETAMINE SULFATE 2.5; 2.5; 2.5; 2.5 MG/1; MG/1; MG/1; MG/1
10 TABLET ORAL DAILY
Qty: 30 TABLET | Refills: 0 | Status: SHIPPED | OUTPATIENT
Start: 2025-08-19